# Patient Record
Sex: MALE | Race: WHITE | Employment: OTHER | URBAN - METROPOLITAN AREA
[De-identification: names, ages, dates, MRNs, and addresses within clinical notes are randomized per-mention and may not be internally consistent; named-entity substitution may affect disease eponyms.]

---

## 2017-06-20 ENCOUNTER — OFFICE VISIT (OUTPATIENT)
Dept: DERMATOLOGY CLINIC | Facility: CLINIC | Age: 82
End: 2017-06-20

## 2017-06-20 DIAGNOSIS — L72.11 PILAR CYST: Primary | ICD-10-CM

## 2017-06-20 DIAGNOSIS — L81.4 SOLAR LENTIGO: ICD-10-CM

## 2017-06-20 PROCEDURE — 99213 OFFICE O/P EST LOW 20 MIN: CPT | Performed by: DERMATOLOGY

## 2017-06-20 PROCEDURE — G0463 HOSPITAL OUTPT CLINIC VISIT: HCPCS | Performed by: DERMATOLOGY

## 2017-06-20 RX ORDER — GABAPENTIN 400 MG/1
CAPSULE ORAL
Qty: 180 CAPSULE | Refills: 1 | Status: SHIPPED | OUTPATIENT
Start: 2017-06-20

## 2017-06-20 NOTE — PROGRESS NOTES
HPI:     Chief Complaint     Lesion        HPI     Lesion    Additional comments: Last visit to derm was 10 months prior with Dr. Harika Quesada. Pt states that he requested  to see Dr. Mili Mckeon  today.   Pt is concerned with lesion to mid frontal scalp that has b Education: N/A  Number of Children: N/A     Occupational History  None on file     Social History Main Topics   Smoking status: Former Smoker     Smokeless tobacco: Not on file    Alcohol Use: Yes  0.0 oz/week    0 Standard drinks or equivalent per week

## 2017-06-20 NOTE — PROGRESS NOTES
Past Medical History   Diagnosis Date   • Unspecified essential hypertension          Past Surgical History    SURGERY - [facility]      Comment back surgery    HIP REPLACEMENT SURGERY      KNEE REPLACEMENT SURGERY      Comment rt.         Social History   Marital

## 2017-07-06 ENCOUNTER — OFFICE VISIT (OUTPATIENT)
Dept: FAMILY MEDICINE CLINIC | Facility: CLINIC | Age: 82
End: 2017-07-06

## 2017-07-06 ENCOUNTER — HOSPITAL ENCOUNTER (OUTPATIENT)
Dept: GENERAL RADIOLOGY | Age: 82
Discharge: HOME | End: 2017-07-06
Attending: FAMILY MEDICINE | Admitting: FAMILY MEDICINE
Payer: MEDICARE

## 2017-07-06 VITALS
HEIGHT: 63 IN | SYSTOLIC BLOOD PRESSURE: 129 MMHG | WEIGHT: 207 LBS | HEART RATE: 64 BPM | RESPIRATION RATE: 16 BRPM | TEMPERATURE: 98 F | DIASTOLIC BLOOD PRESSURE: 68 MMHG | BODY MASS INDEX: 36.68 KG/M2

## 2017-07-06 DIAGNOSIS — R42 VERTIGO: ICD-10-CM

## 2017-07-06 DIAGNOSIS — R07.81 RIB PAIN ON LEFT SIDE: ICD-10-CM

## 2017-07-06 PROCEDURE — G0463 HOSPITAL OUTPT CLINIC VISIT: HCPCS | Performed by: FAMILY MEDICINE

## 2017-07-06 PROCEDURE — 71100 X-RAY EXAM RIBS UNI 2 VIEWS: CPT | Performed by: FAMILY MEDICINE

## 2017-07-06 PROCEDURE — 99214 OFFICE O/P EST MOD 30 MIN: CPT | Performed by: FAMILY MEDICINE

## 2017-07-06 RX ORDER — MECLIZINE HCL 12.5 MG/1
12.5 TABLET ORAL 3 TIMES DAILY PRN
Qty: 30 TABLET | Refills: 0 | Status: SHIPPED | OUTPATIENT
Start: 2017-07-06 | End: 2017-07-25

## 2017-07-06 NOTE — PROGRESS NOTES
HPI:    Patient ID: Steve Winslow is a 80year old male. Pt presents with pain of left lower side and back for about 3 weeks. Pt states localilzed over the lower rib area. Pt denies any known trauma. No sig GI  Or  symptoms.  Pt has been taking advil (PROSCAR) 5 MG Oral Tab Take 5 mg by mouth daily. Disp:  Rfl:    tamsulosin HCl (FLOMAX) 0.4 MG Oral Cap Take  by mouth nightly.  Disp:  Rfl:      Allergies:No Known Allergies   PHYSICAL EXAM:   Physical Exam   Constitutional: He is oriented to person, plac Meclizine HCl 12.5 MG Oral Tab 30 tablet 0      Sig: Take 1 tablet (12.5 mg total) by mouth 3 (three) times daily as needed.            Imaging & Referrals:  None       WQ#[id number]

## 2017-07-26 RX ORDER — MECLIZINE HCL 12.5 MG/1
TABLET ORAL
Qty: 30 TABLET | Refills: 0 | Status: SHIPPED | OUTPATIENT
Start: 2017-07-26

## 2017-07-26 NOTE — TELEPHONE ENCOUNTER
Message noted: Chart reviewed and may refill medication as requested times one. Prescription sent to listed pharmacy. Pharmacy to notify patient.

## 2017-09-21 ENCOUNTER — OFFICE VISIT (OUTPATIENT)
Dept: FAMILY MEDICINE CLINIC | Facility: CLINIC | Age: 82
End: 2017-09-21

## 2017-09-21 VITALS
SYSTOLIC BLOOD PRESSURE: 149 MMHG | DIASTOLIC BLOOD PRESSURE: 68 MMHG | TEMPERATURE: 98 F | BODY MASS INDEX: 29 KG/M2 | HEART RATE: 67 BPM | RESPIRATION RATE: 16 BRPM | WEIGHT: 163 LBS

## 2017-09-21 DIAGNOSIS — R42 DIZZINESS: ICD-10-CM

## 2017-09-21 DIAGNOSIS — H53.9 VISUAL DISTURBANCES: Primary | ICD-10-CM

## 2017-09-21 PROCEDURE — G0463 HOSPITAL OUTPT CLINIC VISIT: HCPCS | Performed by: FAMILY MEDICINE

## 2017-09-21 PROCEDURE — 99213 OFFICE O/P EST LOW 20 MIN: CPT | Performed by: FAMILY MEDICINE

## 2017-09-21 NOTE — PROGRESS NOTES
HPI:    Patient ID: Kyra Webster is a 80year old male. Pt presents with some dizziness with movement and light headedness at times when he gets up. Pt tired to get up slowly. This has been present over the last couple months.  Pt has been taking mecli intact distal pulses. Pulmonary/Chest: Effort normal and breath sounds normal.   Abdominal: Soft. Lymphadenopathy:     He has no cervical adenopathy. Neurological: He is alert. He has normal reflexes. No cranial nerve deficit.  Coordination normal.

## 2017-09-29 ENCOUNTER — TELEPHONE (OUTPATIENT)
Dept: FAMILY MEDICINE CLINIC | Facility: CLINIC | Age: 82
End: 2017-09-29

## 2017-10-03 ENCOUNTER — NURSE ONLY (OUTPATIENT)
Dept: FAMILY MEDICINE CLINIC | Facility: CLINIC | Age: 82
End: 2017-10-03

## 2017-10-03 DIAGNOSIS — Z23 NEED FOR INFLUENZA VACCINATION: ICD-10-CM

## 2017-10-03 DIAGNOSIS — Z23 NEED FOR PNEUMOCOCCAL VACCINATION: ICD-10-CM

## 2017-10-03 PROCEDURE — G0009 ADMIN PNEUMOCOCCAL VACCINE: HCPCS | Performed by: FAMILY MEDICINE

## 2017-10-03 PROCEDURE — 90732 PPSV23 VACC 2 YRS+ SUBQ/IM: CPT | Performed by: FAMILY MEDICINE

## 2017-10-03 PROCEDURE — G0008 ADMIN INFLUENZA VIRUS VAC: HCPCS | Performed by: FAMILY MEDICINE

## 2017-10-03 PROCEDURE — 90653 IIV ADJUVANT VACCINE IM: CPT | Performed by: FAMILY MEDICINE

## 2017-10-05 ENCOUNTER — NURSE TRIAGE (OUTPATIENT)
Dept: OTHER | Age: 82
End: 2017-10-05

## 2017-10-05 ENCOUNTER — OFFICE VISIT (OUTPATIENT)
Dept: FAMILY MEDICINE CLINIC | Facility: CLINIC | Age: 82
End: 2017-10-05

## 2017-10-05 VITALS
TEMPERATURE: 98 F | RESPIRATION RATE: 16 BRPM | DIASTOLIC BLOOD PRESSURE: 72 MMHG | HEIGHT: 63 IN | SYSTOLIC BLOOD PRESSURE: 126 MMHG | BODY MASS INDEX: 35.97 KG/M2 | WEIGHT: 203 LBS | HEART RATE: 64 BPM

## 2017-10-05 DIAGNOSIS — T50.Z95A VACCINE REACTION, INITIAL ENCOUNTER: ICD-10-CM

## 2017-10-05 PROCEDURE — 99213 OFFICE O/P EST LOW 20 MIN: CPT | Performed by: FAMILY MEDICINE

## 2017-10-05 PROCEDURE — G0463 HOSPITAL OUTPT CLINIC VISIT: HCPCS | Performed by: FAMILY MEDICINE

## 2017-10-05 NOTE — TELEPHONE ENCOUNTER
Action Requested: Summary for Provider     []  Critical Lab, Recommendations Needed  [] Need Additional Advice  []   FYI    []   Need Orders  [] Need Medications Sent to Pharmacy  []  Other     SUMMARY: OV scheduled for today at 1pm    Pt states that he re

## 2017-10-05 NOTE — PROGRESS NOTES
HPI:    Patient ID: Migdalia Pedraza is a 80year old male. Pt presents with redness, swelling and pain of the right upper arm after receiving the pneumonia vaccine a few days ago. No other injury or acute issues.  Pt used some topical agents and swelling Follow up as needed. Discussed ice and otc antihistamine if needed; Call if any sig symptoms. Follow up as needed. No orders of the defined types were placed in this encounter.       Meds This Visit:  No prescriptions requested or ordered in this enc

## 2017-11-21 ENCOUNTER — APPOINTMENT (OUTPATIENT)
Dept: CT IMAGING | Facility: HOSPITAL | Age: 82
DRG: 291 | End: 2017-11-21
Attending: EMERGENCY MEDICINE
Payer: MEDICARE

## 2017-11-21 ENCOUNTER — HOSPITAL ENCOUNTER (INPATIENT)
Facility: HOSPITAL | Age: 82
LOS: 6 days | Discharge: NURSING, REHAB OR LONG-TERM CARE | DRG: 291 | End: 2017-11-27
Attending: EMERGENCY MEDICINE | Admitting: HOSPITALIST
Payer: MEDICARE

## 2017-11-21 ENCOUNTER — APPOINTMENT (OUTPATIENT)
Dept: GENERAL RADIOLOGY | Facility: HOSPITAL | Age: 82
DRG: 291 | End: 2017-11-21
Attending: EMERGENCY MEDICINE
Payer: MEDICARE

## 2017-11-21 DIAGNOSIS — R09.02 HYPOXIA: Primary | ICD-10-CM

## 2017-11-21 PROCEDURE — 99222 1ST HOSP IP/OBS MODERATE 55: CPT | Performed by: INTERNAL MEDICINE

## 2017-11-21 PROCEDURE — 71010 XR CHEST AP PORTABLE  (CPT=71010): CPT | Performed by: EMERGENCY MEDICINE

## 2017-11-21 PROCEDURE — 71260 CT THORAX DX C+: CPT | Performed by: EMERGENCY MEDICINE

## 2017-11-21 PROCEDURE — 99223 1ST HOSP IP/OBS HIGH 75: CPT | Performed by: HOSPITALIST

## 2017-11-21 RX ORDER — SODIUM PHOSPHATE, DIBASIC AND SODIUM PHOSPHATE, MONOBASIC 7; 19 G/133ML; G/133ML
1 ENEMA RECTAL ONCE AS NEEDED
Status: DISCONTINUED | OUTPATIENT
Start: 2017-11-21 | End: 2017-11-27

## 2017-11-21 RX ORDER — DOCUSATE SODIUM 100 MG/1
100 CAPSULE, LIQUID FILLED ORAL 2 TIMES DAILY
Status: DISCONTINUED | OUTPATIENT
Start: 2017-11-21 | End: 2017-11-27

## 2017-11-21 RX ORDER — ACETAMINOPHEN 325 MG/1
650 TABLET ORAL EVERY 6 HOURS PRN
Status: DISCONTINUED | OUTPATIENT
Start: 2017-11-21 | End: 2017-11-27

## 2017-11-21 RX ORDER — FUROSEMIDE 10 MG/ML
40 INJECTION INTRAMUSCULAR; INTRAVENOUS
Status: DISCONTINUED | OUTPATIENT
Start: 2017-11-21 | End: 2017-11-23

## 2017-11-21 RX ORDER — SODIUM CHLORIDE 0.9 % (FLUSH) 0.9 %
3 SYRINGE (ML) INJECTION AS NEEDED
Status: DISCONTINUED | OUTPATIENT
Start: 2017-11-21 | End: 2017-11-27

## 2017-11-21 RX ORDER — BISACODYL 10 MG
10 SUPPOSITORY, RECTAL RECTAL
Status: DISCONTINUED | OUTPATIENT
Start: 2017-11-21 | End: 2017-11-27

## 2017-11-21 RX ORDER — POLYETHYLENE GLYCOL 3350 17 G/17G
17 POWDER, FOR SOLUTION ORAL DAILY PRN
Status: DISCONTINUED | OUTPATIENT
Start: 2017-11-21 | End: 2017-11-27

## 2017-11-21 RX ORDER — ONDANSETRON 2 MG/ML
4 INJECTION INTRAMUSCULAR; INTRAVENOUS EVERY 6 HOURS PRN
Status: DISCONTINUED | OUTPATIENT
Start: 2017-11-21 | End: 2017-11-27

## 2017-11-21 RX ORDER — HEPARIN SODIUM 5000 [USP'U]/ML
5000 INJECTION, SOLUTION INTRAVENOUS; SUBCUTANEOUS EVERY 12 HOURS SCHEDULED
Status: DISCONTINUED | OUTPATIENT
Start: 2017-11-21 | End: 2017-11-27

## 2017-11-21 NOTE — ED NOTES
Spoke to Inés in laboratory regarding trop 0 and BNP lab work that was added on- he states he will address it and call if any issues. Will watch for results.

## 2017-11-21 NOTE — CONSULTS
[facility] AND [facility]  [facility] Cardiology Consult Note    Key Gagnon Patient Status:  Emergency    10/11/1928 MRN [id number]   Location [address] Attending Pradip Dunn MD   Hosp Day # 0 PCP Anastasia Corral MD     80 year drugs.    Objective:   Temp: 98.1 °F (36.7 °C)  Pulse: 57  Resp: 19  BP: 124/55    Intake/Output:   No intake or output data in the 24 hours ending 11/21/17 1713    Physical Exam:     General: Alert and oriented x 3. No apparent distress.  No respiratory or

## 2017-11-21 NOTE — ED NOTES
Report given to [facility] Patient updated on room assignment and accepting of admission plan. Cardiology at the bedside at this time. Tele box ordered. Belongings gathered for admission. RN made aware of next lactic acid collection to be drawn at 1817.  Aw

## 2017-11-21 NOTE — ED INITIAL ASSESSMENT (HPI)
Pt reports getting dizziness and falling for the past 2 weeks. Pt was at the independent living when he fell in the hallway. Pt denies pain. No loc. Pt was on his way to his pmd when this occurred.

## 2017-11-21 NOTE — H&P
[facility]    PATIENT'S NAME: Abi Soto PHYSICIAN: Mario Blue MD   PATIENT ACCOUNT#:   [id number]    LOCATION:  [place] [address]  MEDICAL RECORD #:   [id number]       YOB: 1928  ADMISSION DATE:       11/21/20 living. No significant alcohol or drug use. REVIEW OF SYSTEMS:  The patient said he had progressive dyspnea on exertion for the last week or two. No orthopnea. No fever or chills. No cough. No chest pain.   No abdominal pain or change in bowel movem

## 2017-11-21 NOTE — ED PROVIDER NOTES
Patient Seen in: [facility] AND [facility] Emergency Department     History   Patient presents with:  Dizziness (neurologic)    Stated Complaint:     HPI    80year old male presents to the ED complaining of intermittent episodes of lightheadedness/near syncope fo reviewed. All other systems reviewed and negative except as noted above. PSFH elements reviewed from today and agreed except as otherwise stated in HPI.     Physical Exam   ED Triage Vitals  BP: 130/71 [11/21/17 1316]  Pulse: 71 [11/21/17 1316]  Res vitals reviewed  Labs Reviewed   BASIC METABOLIC PANEL (8) - Abnormal; Notable for the following:        Result Value    Glucose 137 (*)     GFR, Non- 56 (*)     All other components within normal limits   BNP (B TYPE NATRIUERTIC PEPTIDE) - INDICATIONS: Essential HypertensionDizziness and shortness of breath     today. TECHNIQUE:   Single view. FINDINGS:     CARDIAC/VASC: Mild cardiomegaly.  Pulmonary vascular margins obscured    MEDIAST/MAHOGANY:   Atherosclerotic aorta with no those reports. Radiology Interpretation: Radiology reports and images reviewed personally and are remarkable for interstitial infiltrate, likely, edema.  I discussed these results, their acute management issues, and the need for follow-up, with the patien up information were provided prior to discharge from the ED if sent home.  We also recommended that the patient schedule follow up care with a primary care provider as soon as possible to obtain basic health screening including reassessment of blood pressur

## 2017-11-22 ENCOUNTER — APPOINTMENT (OUTPATIENT)
Dept: CV DIAGNOSTICS | Facility: HOSPITAL | Age: 82
DRG: 291 | End: 2017-11-22
Attending: HOSPITALIST
Payer: MEDICARE

## 2017-11-22 PROCEDURE — 99232 SBSQ HOSP IP/OBS MODERATE 35: CPT | Performed by: INTERNAL MEDICINE

## 2017-11-22 PROCEDURE — 99233 SBSQ HOSP IP/OBS HIGH 50: CPT | Performed by: HOSPITALIST

## 2017-11-22 PROCEDURE — 93306 TTE W/DOPPLER COMPLETE: CPT | Performed by: HOSPITALIST

## 2017-11-22 RX ORDER — IPRATROPIUM BROMIDE AND ALBUTEROL SULFATE 2.5; .5 MG/3ML; MG/3ML
3 SOLUTION RESPIRATORY (INHALATION) EVERY 6 HOURS
Status: DISCONTINUED | OUTPATIENT
Start: 2017-11-22 | End: 2017-11-23

## 2017-11-22 RX ORDER — 0.9 % SODIUM CHLORIDE 0.9 %
VIAL (ML) INJECTION
Status: COMPLETED
Start: 2017-11-22 | End: 2017-11-22

## 2017-11-22 RX ORDER — POTASSIUM CHLORIDE 20 MEQ/1
40 TABLET, EXTENDED RELEASE ORAL EVERY 4 HOURS
Status: COMPLETED | OUTPATIENT
Start: 2017-11-22 | End: 2017-11-22

## 2017-11-22 RX ORDER — GABAPENTIN 400 MG/1
1200 CAPSULE ORAL 3 TIMES DAILY
Status: ON HOLD | COMMUNITY
End: 2017-11-23

## 2017-11-22 NOTE — PLAN OF CARE
Problem: SAFETY ADULT - FALL  Goal: Free from fall injury  INTERVENTIONS:  - Assess pt frequently for physical needs  - Identify cognitive and physical deficits and behaviors that affect risk of falls.   - [place] fall precautions as indicated by assessme

## 2017-11-22 NOTE — CONSULTS
[facility]    Report of Consultation    Violetayne Pyo Patient Status:  Inpatient    10/11/1928 MRN [id number]   Location [facility] 3W/SW Attending Charisse Kaur MD   Hosp Day # 0 PCP Hernando Peralta MD     Date of Admission: ondansetron HCl (ZOFRAN) injection 4 mg 4 mg Intravenous Q6H PRN   docusate sodium (COLACE) cap 100 mg 100 mg Oral BID   PEG 3350 (MIRALAX) powder packet 17 g 17 g Oral Daily PRN   magnesium hydroxide (MILK OF MAGNESIA) 400 MG/5ML suspension 30 mL 30 mL edema  Neurologic: no gross motor deficits  Skin: warm, dry    Results:   Laboratory Data    Lab Results  Component Value Date   WBC 9.4 11/21/2017   HGB 12.6 (L) 11/21/2017   HCT 38.1 (L) 11/21/2017    11/21/2017   CREATSERUM 1.22 11/21/2017   BUN significant dyspnea with exertion over the course last week. Concern clinically for CHF exacerbation. -CT chest reviewed with evidence of diffuse lateral groundglass opacities with septal thinning seen. Clinically concerning for pulmonary edema.   Parase

## 2017-11-22 NOTE — PROGRESS NOTES
[facility]  Hospitalist Progress  Note     Key Gagnon Patient Status:  Inpatient    10/11/1928  80year old CSN [id number]   Location 343/343-A Attending Dandre Garcia MD   Hosp Day # 1 PCP Anastasia Corral MD     ASSESSMENT/PLAN    Ac GFRNAA  56*  >60   CA  8.8  8.7   NA  138  137   K  3.9  3.4   CL  106  102   CO2  23  26     No results for input(s): PT, INR, PTT in the last 72 hours.     • ipratropium-albuterol  3 mL Nebulization Q6H   • Potassium Chloride ER  40 mEq Oral Q4H   • fur

## 2017-11-22 NOTE — PROGRESS NOTES
[facility]     Progress Note        Serge Vick Patient Status:  Inpatient    10/11/1928 MRN [id number]   Location [facility] 3W/SW Attending Neal Morejon MD   Hosp Day # 1 PCP Isatu Larsen MD       Subjective:   Patient BUN 11 11/22/2017    11/22/2017   K 3.4 11/22/2017    11/22/2017   CO2 26 11/22/2017    11/22/2017   CA 8.7 11/22/2017   MG 2.0 11/22/2017   TROP 0.01 11/21/2017       Xr Chest Ap Portable  (cpt=71010)    Result Date: 11/21/2017  CONCL CHF exacerbation. -CT chest reviewed with evidence of diffuse lateral groundglass opacities with septal thinning seen. Clinically concerning for pulmonary edema. Paraseptal emphysema along with mediastinal and bilateral hilar lymphadenopathy also noted.

## 2017-11-22 NOTE — PROGRESS NOTES
[facility] AND [facility]  [facility] Cardiology Progress Note    Gabby Giron Patient Status:  Inpatient    10/11/1928 MRN [id number]   Location [facility] 3W/SW Attending Yu Nguyen MD   Hosp Day # 1 PCP Yoni Joyce MD     80year old male, con sclera, neck supple. Neck: No JVD, carotids 2+, no bruits. Cardiac: Regular rate and rhythm. S1, S2 normal. No murmur, pericardial rub, S3.  Lungs: Clear without wheezes, rales, rhonchi or dullness. Normal excursions and effort.   Abdomen: Soft, non-tend

## 2017-11-23 PROCEDURE — 99233 SBSQ HOSP IP/OBS HIGH 50: CPT | Performed by: HOSPITALIST

## 2017-11-23 PROCEDURE — 99233 SBSQ HOSP IP/OBS HIGH 50: CPT | Performed by: INTERNAL MEDICINE

## 2017-11-23 RX ORDER — FUROSEMIDE 40 MG/1
40 TABLET ORAL
Status: DISCONTINUED | OUTPATIENT
Start: 2017-11-23 | End: 2017-11-24

## 2017-11-23 RX ORDER — FINASTERIDE 5 MG/1
5 TABLET, FILM COATED ORAL DAILY
Status: DISCONTINUED | OUTPATIENT
Start: 2017-11-23 | End: 2017-11-27

## 2017-11-23 RX ORDER — AMLODIPINE BESYLATE 10 MG/1
10 TABLET ORAL DAILY
Status: DISCONTINUED | OUTPATIENT
Start: 2017-11-23 | End: 2017-11-24

## 2017-11-23 RX ORDER — ALFUZOSIN HYDROCHLORIDE 10 MG/1
10 TABLET, EXTENDED RELEASE ORAL
Status: DISCONTINUED | OUTPATIENT
Start: 2017-11-23 | End: 2017-11-27

## 2017-11-23 RX ORDER — PANTOPRAZOLE SODIUM 40 MG/1
40 TABLET, DELAYED RELEASE ORAL
Status: DISCONTINUED | OUTPATIENT
Start: 2017-11-23 | End: 2017-11-27

## 2017-11-23 RX ORDER — ATENOLOL 50 MG/1
50 TABLET ORAL DAILY
Status: DISCONTINUED | OUTPATIENT
Start: 2017-11-23 | End: 2017-11-24

## 2017-11-23 RX ORDER — IPRATROPIUM BROMIDE AND ALBUTEROL SULFATE 2.5; .5 MG/3ML; MG/3ML
3 SOLUTION RESPIRATORY (INHALATION)
Status: DISCONTINUED | OUTPATIENT
Start: 2017-11-23 | End: 2017-11-26

## 2017-11-23 RX ORDER — GABAPENTIN 400 MG/1
1200 CAPSULE ORAL 2 TIMES DAILY
Status: DISCONTINUED | OUTPATIENT
Start: 2017-11-23 | End: 2017-11-27

## 2017-11-23 RX ORDER — IPRATROPIUM BROMIDE AND ALBUTEROL SULFATE 2.5; .5 MG/3ML; MG/3ML
3 SOLUTION RESPIRATORY (INHALATION) EVERY 6 HOURS PRN
Status: DISCONTINUED | OUTPATIENT
Start: 2017-11-23 | End: 2017-11-26

## 2017-11-23 NOTE — PROGRESS NOTES
Pulmonary/Critical Care Follow Up Note    HPI:   Francis York is a 80year old male with Patient presents with:  Dizziness (neurologic)      PCP Jake Gomez MD  Admission Attending Beba Melendez MD    Hospital Day #2    Got up to bathroom and then g temperature source Oral, resp. rate 20, height 5' 5\" (1.651 m), weight 189 lb 8 oz (86 kg), SpO2 94 %.     Intake/Output Summary (Last 24 hours) at 11/23/17 1313  Last data filed at 11/23/17 0612   Gross per 24 hour   Intake                0 ml   Output

## 2017-11-23 NOTE — PROGRESS NOTES
[facility] AND [facility]  [facility] Cardiology Progress Note    Danielito Felipe Patient Status:  Inpatient    10/11/1928 MRN [id number]   Location [facility] 3W/SW Attending Selvin Banda MD   Hosp Day # 2 PCP Celestina Mcclure MD     80year old male, con neck supple. Neck: No JVD, carotids 2+, no bruits. Cardiac: Regular rate and rhythm. S1, S2 normal. No murmur, pericardial rub, S3.  Lungs: Clear without wheezes, rales, rhonchi or dullness. Normal excursions and effort. Abdomen: Soft, non-tender.  BS-p

## 2017-11-23 NOTE — PROGRESS NOTES
[facility]  Hospitalist Progress  Note     Lyndsay Greene Patient Status:  Inpatient    10/11/1928  80year old CSN [id number]   Location 343/343-A Attending Kody Powers MD   Hosp Day # 2 PCP Varun Ledezma MD     ASSESSMENT/PLAN    Ac 11/21/17   1319  11/22/17   0555  11/22/17   1542  11/23/17   0621   GLU  137*  128*   --   149*   BUN  17  11   --   13   CREATSERUM  1.22  1.03   --   1.33   GFRAA  >60  >60   --   >60   GFRNAA  56*  >60   --   51*   CA  8.8  8.7   --   9.5   NA  138  13

## 2017-11-23 NOTE — PLAN OF CARE
RESPIRATORY - ADULT    • Achieves optimal ventilation and oxygenation Progressing        SAFETY ADULT - FALL    • Free from fall injury Progressing          Pt denies cp/sob at rest. Echo completed. IV lasix BID.  Upon ambulation patient desatted to 80% on

## 2017-11-24 ENCOUNTER — APPOINTMENT (OUTPATIENT)
Dept: GENERAL RADIOLOGY | Facility: HOSPITAL | Age: 82
DRG: 291 | End: 2017-11-24
Attending: HOSPITALIST
Payer: MEDICARE

## 2017-11-24 PROCEDURE — 99232 SBSQ HOSP IP/OBS MODERATE 35: CPT | Performed by: INTERNAL MEDICINE

## 2017-11-24 PROCEDURE — 71020 XR CHEST PA + LAT CHEST (CPT=71020): CPT | Performed by: HOSPITALIST

## 2017-11-24 PROCEDURE — 99233 SBSQ HOSP IP/OBS HIGH 50: CPT | Performed by: HOSPITALIST

## 2017-11-24 RX ORDER — HYDRALAZINE HYDROCHLORIDE 25 MG/1
25 TABLET, FILM COATED ORAL EVERY 8 HOURS SCHEDULED
Status: DISCONTINUED | OUTPATIENT
Start: 2017-11-24 | End: 2017-11-27

## 2017-11-24 RX ORDER — METOPROLOL SUCCINATE 50 MG/1
50 TABLET, EXTENDED RELEASE ORAL
Status: DISCONTINUED | OUTPATIENT
Start: 2017-11-25 | End: 2017-11-27

## 2017-11-24 NOTE — CM/SW NOTE
11/24-MD orders received in regards to discharge planning. The Patientwas seen at bedside. The Patient resides alone in [place] in a [address] [place] [address] Road.   Prior to hospitalization, the Patient wasn't driving, but doing grocery shopping, e

## 2017-11-24 NOTE — OCCUPATIONAL THERAPY NOTE
OCCUPATIONAL THERAPY EVALUATION - INPATIENT     Room Number: 343/343-A  Evaluation Date: 11/24/2017  Type of Evaluation: Initial  Presenting Problem:  (SOB)    Physician Order: IP Consult to Occupational Therapy  Reason for Therapy: ADL/IADL Dysfunction an Plan: Balance activities; Energy conservation/work simplification techniques;ADL training;Functional transfer training; Endurance training;Patient/Family education;Patient/Family training         OCCUPATIONAL THERAPY MEDICAL/SOCIAL HISTORY     Problem List need…  -   Putting on and taking off regular lower body clothing?: A Lot  -   Bathing (including washing, rinsing, drying)?: A Lot  -   Toileting, which includes using toilet, bedpan or urinal? : A Lot  -   Putting on and taking off regular upper body clot

## 2017-11-24 NOTE — PROGRESS NOTES
[facility] HOSP - [facility]  Progress Note     Francis York  : 10/11/1928    Status: Inpatient  Day #: 3    Attending: Delaney Baer MD  PCP: Jake Gomez MD      Assessment and Plan     Acute respiratory failure with hypoxemia.   Admission O2 sat was 87% 30.7  30.8   MCHC  33.1  33.1  32.9   RDW  15.9*  16.2*  16.8*     Recent Labs   Lab  11/21/17   1319  11/22/17   0555  11/22/17   1542  11/23/17   0621  11/24/17   0545   BUN  17  11   --   13  25*   CREATSERUM  1.22  1.03   --   1.33  1.79*   GFRAA  >60

## 2017-11-24 NOTE — PHYSICAL THERAPY NOTE
PHYSICAL THERAPY EVALUATION - INPATIENT     Room Number: 343/343-A  Evaluation Date: 11/24/2017  Type of Evaluation: Initial  Physician Order: PT Eval and Treat    Presenting Problem: hypoxia and falls  Reason for Therapy: Mobility Dysfunction and Disc mechanics; Endurance; Patient education;Gait training;Transfer training;Balance training  Rehab Potential : Fair  Frequency (Obs): 3x/week       PHYSICAL THERAPY MEDICAL/SOCIAL HISTORY   Problem List  Principal Problem:    Hypoxia  Active Problems:    Acute '6-Clicks' INPATIENT SHORT FORM - BASIC MOBILITY  How much difficulty does the patient currently have. ..  -   Turning over in bed (including adjusting bedclothes, sheets and blankets)?: A Little   -   Sitting down on and standing up from a chair with arms #6  Current Status

## 2017-11-24 NOTE — PLAN OF CARE
CARDIOVASCULAR - ADULT    • Maintains optimal cardiac output and hemodynamic stability Progressing        Patient/Family Goals    • Patient/Family Long Term Goal Progressing    • Patient/Family Short Term Goal Progressing        RESPIRATORY - ADULT    • Ac

## 2017-11-24 NOTE — PROGRESS NOTES
[facility]    Cardiology Progress Note  [facility]    Lm  Patient Status:  Inpatient    10/11/1928 MRN [id number]   Location [facility] 3W/SW Attending Jack Nieves MD   Hosp Day # 3 PCP Aquiles Rich  11/24/2017   CO2 25 11/24/2017    (H) 11/24/2017   CA 9.4 11/24/2017   ALB 4.3 12/14/2015   BILT 1.9 (H) 12/14/2015   TP 7.0 12/14/2015   AST 27 12/14/2015   ALT 24 12/14/2015   MG 2.3 11/24/2017   TROP 0.01 11/21/2017       Xr Chest Pa + L

## 2017-11-24 NOTE — PROGRESS NOTES
[facility] HOSP - [facility]     Progress Note        Gabby Giron Patient Status:  Inpatient    10/11/1928 MRN [id number]   Location [facility] 3W/SW Attending Yu Nguyen MD   Hosp Day # 3 PCP Yoni Joyce MD       Subjective:   Patient suppository 10 mg 10 mg Rectal Daily PRN   FLEET ENEMA (FLEET) 7-19 GM/118ML enema 133 mL 1 enema Rectal Once PRN       Continuous Infusions:     Physical Exam  Constitutional: no acute distress  HEENT: PERRL  Cardio: RRR, S1 S2  Respiratory: Faint basilar [address] [place] [address]

## 2017-11-25 PROCEDURE — 99233 SBSQ HOSP IP/OBS HIGH 50: CPT | Performed by: HOSPITALIST

## 2017-11-25 PROCEDURE — 99232 SBSQ HOSP IP/OBS MODERATE 35: CPT | Performed by: INTERNAL MEDICINE

## 2017-11-25 RX ORDER — FUROSEMIDE 20 MG/1
20 TABLET ORAL DAILY
Status: DISCONTINUED | OUTPATIENT
Start: 2017-11-25 | End: 2017-11-27

## 2017-11-25 NOTE — PROGRESS NOTES
[facility] Cardiology Progress Note      Lyndsay Greene Patient Status:  Inpatient    10/11/1928 MRN [id number]   Location [facility] 3W/SW Attending Kasia Peralta MD   Hosp Day # 4 PCP Varun Ledezma MD     ------ nontender, nondistended, no organomegaly, bowel sounds present  Ext:  no clubbing, no cyanosis,no edema  Neuro: no focal deficits  Skin: no rashes or lesions    Scheduled Meds:   • hydrALAzine HCl  25 mg Oral Q8H [facility]   • metoprolol succinate  50 mg Oral Da

## 2017-11-25 NOTE — PROGRESS NOTES
[facility]     Progress Note        Steve Winslow Patient Status:  Inpatient    10/11/1928 MRN [id number]   Location [facility] 3W/SW Attending Suresh Li MD   Hosp Day # 4 PCP Claritza Saul MD       Subjective:   Patient enema 133 mL 1 enema Rectal Once PRN       Continuous Infusions:     Physical Exam  Constitutional: no acute distress  HEENT: PERRL  Cardio: RRR, S1 S2  Respiratory: Faint basilar crackles  GI: abdomen soft, non tender  Extremities: no clubbing, cyanosis,

## 2017-11-25 NOTE — PLAN OF CARE
Problem: SAFETY ADULT - FALL  Goal: Free from fall injury  INTERVENTIONS:  - Assess pt frequently for physical needs  - Identify cognitive and physical deficits and behaviors that affect risk of falls.   - [place] fall precautions as indicated by assessme Progressing      Comments: PO Lasix given. O2 3.5lt/nc, saturating above 90%. Voiding per urinal, incontinent at times. Daughter at bedside.

## 2017-11-25 NOTE — CM/SW NOTE
MD order received regarding discharge planning for rehab. MARINA met with the pt at bedside who is agreeable to going to rehab. The pt. Would like a referral to [facility]. Referral made and DON screen has been requested from [address] [place] [address] Delia. The pt.  Is aware and

## 2017-11-25 NOTE — SIGNIFICANT EVENT
CV R/T PAT. Data: patient had 3.63 sec PAT at 2204. Action: Asymptomatic with PAT. Response: Will continue to monitor the patient status .

## 2017-11-26 PROCEDURE — 99233 SBSQ HOSP IP/OBS HIGH 50: CPT | Performed by: HOSPITALIST

## 2017-11-26 PROCEDURE — 99232 SBSQ HOSP IP/OBS MODERATE 35: CPT | Performed by: INTERNAL MEDICINE

## 2017-11-26 RX ORDER — GUAIFENESIN 600 MG
600 TABLET, EXTENDED RELEASE 12 HR ORAL 2 TIMES DAILY
Status: DISCONTINUED | OUTPATIENT
Start: 2017-11-26 | End: 2017-11-27

## 2017-11-26 RX ORDER — IPRATROPIUM BROMIDE AND ALBUTEROL SULFATE 2.5; .5 MG/3ML; MG/3ML
3 SOLUTION RESPIRATORY (INHALATION)
Status: DISCONTINUED | OUTPATIENT
Start: 2017-11-26 | End: 2017-11-27

## 2017-11-26 RX ORDER — IPRATROPIUM BROMIDE AND ALBUTEROL SULFATE 2.5; .5 MG/3ML; MG/3ML
3 SOLUTION RESPIRATORY (INHALATION) EVERY 6 HOURS PRN
Status: DISCONTINUED | OUTPATIENT
Start: 2017-11-26 | End: 2017-11-27

## 2017-11-26 NOTE — PROGRESS NOTES
[facility] Cardiology Progress Note      Diane Lover Patient Status:  Inpatient    10/11/1928 MRN [id number]   Location [facility] 3W/SW Attending Clara Mckeon MD   Hosp Day # 5 PCP Marybel Cameron MD       ---- Void Urine Occurrence 4 x 1 x 1 x    Incontinent Urine Occurrence 1 x 7 x              Exam  Gen: No acute distress, alert and oriented x3,   Neck:supple,no JVD  Pulm: Lungs clear, normal respiratory effort  CV: Heart with regular rate and rhythm, Nl S1,S2

## 2017-11-26 NOTE — PHYSICAL THERAPY NOTE
PHYSICAL THERAPY TREATMENT NOTE - INPATIENT    Room Number: 343/343-A       Presenting Problem: hypoxia and falls    Problem List  Principal Problem:    Hypoxia  Active Problems:    Acute diastolic CHF (congestive heart failure) ([facility] [address]      ASSESSMENT   Pt assistance  Distance (ft): 2 x 50  Assistive Device: Rolling walker     Stoop/Curb Assistance: Not tested       Additional information:     THERAPEUTIC EXERCISES  Lower Extremity AP,QS,GS,abd/add     Position   supine     Patient End of Session: In bed;Sunny

## 2017-11-26 NOTE — PROGRESS NOTES
[facility]  Progress Note     Juan Santos  : 10/11/1928    Status: Inpatient  Day #: 5    Attending: Elias Garcia MD  PCP: Kaya Cantu MD      Assessment and Plan     Acute respiratory failure with hypoxemia.   Admission O2 sat was 87% 92.6  92.2   MCH  30.8  31.0  31.8   MCHC  32.9  33.4  34.5   RDW  16.8*  16.0*  15.9*     Recent Labs   Lab  11/24/17   0545  11/25/17   0617  11/26/17   0759   BUN  25*  31*  24*   CREATSERUM  1.79*  1.49  1.16   GFRAA  43*  54*  >60   GFRNAA  36*  44*

## 2017-11-26 NOTE — PLAN OF CARE
Problem: SAFETY ADULT - FALL  Goal: Free from fall injury  INTERVENTIONS:  - Assess pt frequently for physical needs  - Identify cognitive and physical deficits and behaviors that affect risk of falls.   - [place] fall precautions as indicated by assessme Progressing      Comments: Wean Oxygen as tolerated. Up in chair. Refused to sit for longer time. Incontinent. To rehab possible in am.Encouraged patient to use incentive spirometry and turn in bed.

## 2017-11-26 NOTE — PROGRESS NOTES
[facility]     Progress Note        Sowmya Jcbhavik Patient Status:  Inpatient    10/11/1928 MRN [id number]   Location [facility] 3W/SW Attending Jay Andrea MD   Hosp Day # 5 PCP Jose Gimenez MD       Subjective:   Patient rectal suppository 10 mg 10 mg Rectal Daily PRN   FLEET ENEMA (FLEET) 7-19 GM/118ML enema 133 mL 1 enema Rectal Once PRN       Continuous Infusions:     Physical Exam  Constitutional: no acute distress  HEENT: PERRL  Cardio: RRR, S1 S2  Respiratory: Faint

## 2017-11-27 VITALS
TEMPERATURE: 98 F | SYSTOLIC BLOOD PRESSURE: 134 MMHG | HEIGHT: 65 IN | DIASTOLIC BLOOD PRESSURE: 67 MMHG | HEART RATE: 80 BPM | OXYGEN SATURATION: 96 % | BODY MASS INDEX: 32.77 KG/M2 | RESPIRATION RATE: 20 BRPM | WEIGHT: 196.69 LBS

## 2017-11-27 PROCEDURE — 99239 HOSP IP/OBS DSCHRG MGMT >30: CPT | Performed by: HOSPITALIST

## 2017-11-27 RX ORDER — HYDRALAZINE HYDROCHLORIDE 25 MG/1
25 TABLET, FILM COATED ORAL EVERY 8 HOURS SCHEDULED
Qty: 90 TABLET | Refills: 0 | Status: SHIPPED | OUTPATIENT
Start: 2017-11-27 | End: 2018-01-11

## 2017-11-27 RX ORDER — FUROSEMIDE 20 MG/1
20 TABLET ORAL DAILY
Qty: 30 TABLET | Refills: 0 | Status: SHIPPED | OUTPATIENT
Start: 2017-11-28 | End: 2018-01-11

## 2017-11-27 RX ORDER — IPRATROPIUM BROMIDE AND ALBUTEROL SULFATE 2.5; .5 MG/3ML; MG/3ML
3 SOLUTION RESPIRATORY (INHALATION) EVERY 6 HOURS PRN
Refills: 0 | Status: SHIPPED | COMMUNITY
Start: 2017-11-27 | End: 2018-01-31

## 2017-11-27 RX ORDER — METOPROLOL SUCCINATE 50 MG/1
50 TABLET, EXTENDED RELEASE ORAL
Qty: 30 TABLET | Refills: 0 | Status: SHIPPED | OUTPATIENT
Start: 2017-11-28 | End: 2018-01-11

## 2017-11-27 NOTE — DISCHARGE SUMMARY
[facility] - [facility]  Discharge Summary     Jennifer Chavez  : 10/11/1928    Status: Inpatient  Day #: 6    Attending: Chela Su MD  PCP: Sandeep Grant MD     Date of Admission: 2017  Date of Discharge: 2017     Hospital Discharge Diag normal bowel sounds  Musculoskeletal:  No joint swelling  Extremities:  No edema, no cyanosis, no clubbing  Neurologic:  nonfocal  Psychiatric:  Normal affect, calm and appropriate  Skin:  No rash, no lesion         Discharge Medications      START taking Get Your Medications      Please  your prescriptions at the location directed by your doctor or nurse    Bring a paper prescription for each of these medications  · furosemide 20 MG Tabs  · hydrALAzine HCl 25 MG Tabs  · Metoprolol Succinate ER 50 MG

## 2017-11-27 NOTE — CM/SW NOTE
11/27/17 CM Discharge planning   Spoke with Mindy wilkerson at [facility], bed available for pt today at 2:00 pm, room 301-A, RN report [phone], pt and family aware and in agreement with above rehab transfer.   Karyn Corley  X [phone]

## 2017-11-27 NOTE — PHYSICAL THERAPY NOTE
PHYSICAL THERAPY TREATMENT NOTE - INPATIENT    Room Number: 343/343-A       Presenting Problem: hypoxia and falls    Problem List  Principal Problem:    Hypoxia  Active Problems:    Acute diastolic CHF (congestive heart failure) ([facility] [address]      ASSESSMENT   RN and standing up from a chair with arms (e.g., wheelchair, bedside commode, etc.): A Little   -   Moving from lying on back to sitting on the side of the bed?: A Little   How much help from another person does the patient currently need. ..   -   Moving to a

## 2017-11-28 ENCOUNTER — TELEPHONE (OUTPATIENT)
Dept: CARDIOLOGY UNIT | Facility: HOSPITAL | Age: 82
End: 2017-11-28

## 2017-12-08 ENCOUNTER — OFFICE VISIT (OUTPATIENT)
Dept: CARDIOLOGY CLINIC | Facility: HOSPITAL | Age: 82
End: 2017-12-08
Attending: INTERNAL MEDICINE
Payer: MEDICARE

## 2017-12-08 VITALS
WEIGHT: 199.63 LBS | SYSTOLIC BLOOD PRESSURE: 168 MMHG | HEART RATE: 79 BPM | OXYGEN SATURATION: 97 % | DIASTOLIC BLOOD PRESSURE: 70 MMHG | BODY MASS INDEX: 33 KG/M2

## 2017-12-08 DIAGNOSIS — I50.31 ACUTE DIASTOLIC CHF (CONGESTIVE HEART FAILURE) (HCC): ICD-10-CM

## 2017-12-08 DIAGNOSIS — I50.9 HEART FAILURE, UNSPECIFIED (HCC): Primary | ICD-10-CM

## 2017-12-08 PROCEDURE — 83880 ASSAY OF NATRIURETIC PEPTIDE: CPT | Performed by: CLINICAL NURSE SPECIALIST

## 2017-12-08 PROCEDURE — 99214 OFFICE O/P EST MOD 30 MIN: CPT | Performed by: CLINICAL NURSE SPECIALIST

## 2017-12-08 PROCEDURE — 80048 BASIC METABOLIC PNL TOTAL CA: CPT | Performed by: CLINICAL NURSE SPECIALIST

## 2017-12-08 PROCEDURE — 99211 OFF/OP EST MAY X REQ PHY/QHP: CPT | Performed by: CLINICAL NURSE SPECIALIST

## 2017-12-08 PROCEDURE — 36415 COLL VENOUS BLD VENIPUNCTURE: CPT | Performed by: CLINICAL NURSE SPECIALIST

## 2017-12-08 NOTE — PATIENT INSTRUCTIONS
Saturday and Sunday, please increase furosemide to 20 mg in the morning and 20 mg in the afternoon and then continue furosemide 20 mg daily. Continue tracking daily weights. Call with weight gain of 3 lbs overnight or concerning symptoms.    [phone]

## 2017-12-08 NOTE — PROGRESS NOTES
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 He understands that he may require long term oxygen therapy. His lungs are with bibasilar crackles. No JVD. Denies abdominal swelling/bloating. No BLE edema.  Denies dizziness, lightheaded patient providing counseling, coordination of care and education related specifically to heart failure.       ANJU Ashton  12/8/2017  11:01 AM

## 2017-12-26 ENCOUNTER — TELEPHONE (OUTPATIENT)
Dept: FAMILY MEDICINE CLINIC | Facility: CLINIC | Age: 82
End: 2017-12-26

## 2017-12-26 NOTE — TELEPHONE ENCOUNTER
[facility] from residential home health calling to let Dr. Lesley Rodriguez know that pt was admitted to home health service, she will sent over orders and also request orders  O2 states . Abdiaziz Villanueva please advise

## 2017-12-26 NOTE — TELEPHONE ENCOUNTER
Message noted and approve home health; will sign orders when received. Can approved home health orders requested.

## 2017-12-28 ENCOUNTER — TELEPHONE (OUTPATIENT)
Dept: FAMILY MEDICINE CLINIC | Facility: CLINIC | Age: 82
End: 2017-12-28

## 2017-12-28 NOTE — TELEPHONE ENCOUNTER
Pls call back Starla/ Therapy Supervisor, need verbal Order to move OT to the wk of 12/31/2017. Pls advise.

## 2017-12-28 NOTE — TELEPHONE ENCOUNTER
LMTCB. Please transfer to triage. Name on voicemail did not correspond to number left from Elba Robledo. I did not leave a message.

## 2017-12-29 ENCOUNTER — TELEPHONE (OUTPATIENT)
Dept: FAMILY MEDICINE CLINIC | Facility: CLINIC | Age: 82
End: 2017-12-29

## 2017-12-29 NOTE — TELEPHONE ENCOUNTER
[facility] states pt diagnosed pneunomia-  taking Avalox 400 mg once daily for 7 days    Pt still coughing, has phlegm- listening to lungs noted crackles on left lower lobe     Does Dr Iris King want to extend the antibiotics?     Confirmed

## 2017-12-30 RX ORDER — MOXIFLOXACIN HYDROCHLORIDE 400 MG/1
400 TABLET ORAL DAILY
Qty: 7 TABLET | Refills: 0 | Status: SHIPPED | OUTPATIENT
Start: 2017-12-30 | End: 2018-01-03

## 2017-12-30 NOTE — TELEPHONE ENCOUNTER
Message noted. May continue avelox for another 7 days as requested. Erx sent to listed pharmacy.  To follow up for appointment if not better; Please notify patient

## 2018-01-01 ENCOUNTER — HOSPITAL ENCOUNTER (OUTPATIENT)
Dept: RESPIRATORY THERAPY | Facility: HOSPITAL | Age: 83
Discharge: HOME | End: 2018-01-01
Attending: INTERNAL MEDICINE
Payer: MEDICARE

## 2018-01-01 ENCOUNTER — OFFICE VISIT (OUTPATIENT)
Dept: PULMONOLOGY | Facility: CLINIC | Age: 83
End: 2018-01-01

## 2018-01-01 ENCOUNTER — OFFICE VISIT (OUTPATIENT)
Dept: PULMONOLOGY | Facility: CLINIC | Age: 83
End: 2018-01-01
Payer: MEDICARE

## 2018-01-01 ENCOUNTER — TELEPHONE (OUTPATIENT)
Dept: FAMILY MEDICINE CLINIC | Facility: CLINIC | Age: 83
End: 2018-01-01

## 2018-01-01 ENCOUNTER — TELEPHONE (OUTPATIENT)
Dept: PULMONOLOGY | Facility: CLINIC | Age: 83
End: 2018-01-01

## 2018-01-01 ENCOUNTER — HOSPITAL ENCOUNTER (OUTPATIENT)
Dept: CT IMAGING | Facility: HOSPITAL | Age: 83
Discharge: HOME | End: 2018-01-01
Attending: INTERNAL MEDICINE
Payer: MEDICARE

## 2018-01-01 ENCOUNTER — OFFICE VISIT (OUTPATIENT)
Dept: FAMILY MEDICINE CLINIC | Facility: CLINIC | Age: 83
End: 2018-01-01
Payer: MEDICARE

## 2018-01-01 ENCOUNTER — IMMUNIZATION (OUTPATIENT)
Dept: FAMILY MEDICINE CLINIC | Facility: CLINIC | Age: 83
End: 2018-01-01
Payer: MEDICARE

## 2018-01-01 ENCOUNTER — OFFICE VISIT (OUTPATIENT)
Dept: CARDIOLOGY CLINIC | Facility: CLINIC | Age: 83
End: 2018-01-01

## 2018-01-01 ENCOUNTER — APPOINTMENT (OUTPATIENT)
Dept: LAB | Age: 83
End: 2018-01-01
Attending: NURSE PRACTITIONER
Payer: MEDICARE

## 2018-01-01 ENCOUNTER — PATIENT OUTREACH (OUTPATIENT)
Dept: CASE MANAGEMENT | Age: 83
End: 2018-01-01

## 2018-01-01 ENCOUNTER — MED REC SCAN ONLY (OUTPATIENT)
Dept: FAMILY MEDICINE CLINIC | Facility: CLINIC | Age: 83
End: 2018-01-01

## 2018-01-01 ENCOUNTER — TELEPHONE (OUTPATIENT)
Dept: OTHER | Age: 83
End: 2018-01-01

## 2018-01-01 ENCOUNTER — TELEPHONE (OUTPATIENT)
Dept: INTERNAL MEDICINE CLINIC | Facility: CLINIC | Age: 83
End: 2018-01-01

## 2018-01-01 ENCOUNTER — OFFICE VISIT (OUTPATIENT)
Dept: FAMILY MEDICINE CLINIC | Facility: CLINIC | Age: 83
End: 2018-01-01

## 2018-01-01 ENCOUNTER — OFFICE VISIT (OUTPATIENT)
Dept: CARDIOLOGY CLINIC | Facility: CLINIC | Age: 83
End: 2018-01-01
Payer: MEDICARE

## 2018-01-01 VITALS
WEIGHT: 185 LBS | HEIGHT: 64 IN | RESPIRATION RATE: 16 BRPM | HEART RATE: 78 BPM | DIASTOLIC BLOOD PRESSURE: 77 MMHG | SYSTOLIC BLOOD PRESSURE: 149 MMHG | TEMPERATURE: 98 F | BODY MASS INDEX: 31.58 KG/M2

## 2018-01-01 VITALS
SYSTOLIC BLOOD PRESSURE: 137 MMHG | WEIGHT: 186.81 LBS | BODY MASS INDEX: 33.1 KG/M2 | DIASTOLIC BLOOD PRESSURE: 62 MMHG | HEIGHT: 63 IN | RESPIRATION RATE: 16 BRPM | HEART RATE: 87 BPM

## 2018-01-01 VITALS
RESPIRATION RATE: 20 BRPM | WEIGHT: 185 LBS | DIASTOLIC BLOOD PRESSURE: 67 MMHG | BODY MASS INDEX: 31.58 KG/M2 | TEMPERATURE: 98 F | HEIGHT: 64 IN | HEART RATE: 82 BPM | SYSTOLIC BLOOD PRESSURE: 114 MMHG

## 2018-01-01 VITALS
TEMPERATURE: 97 F | WEIGHT: 185 LBS | DIASTOLIC BLOOD PRESSURE: 63 MMHG | BODY MASS INDEX: 32.78 KG/M2 | OXYGEN SATURATION: 97 % | SYSTOLIC BLOOD PRESSURE: 120 MMHG | HEIGHT: 63 IN | HEART RATE: 68 BPM

## 2018-01-01 VITALS
WEIGHT: 188.38 LBS | SYSTOLIC BLOOD PRESSURE: 128 MMHG | OXYGEN SATURATION: 88 % | RESPIRATION RATE: 19 BRPM | DIASTOLIC BLOOD PRESSURE: 71 MMHG | HEIGHT: 64 IN | HEART RATE: 67 BPM | BODY MASS INDEX: 32.16 KG/M2

## 2018-01-01 VITALS
DIASTOLIC BLOOD PRESSURE: 64 MMHG | BODY MASS INDEX: 32.6 KG/M2 | SYSTOLIC BLOOD PRESSURE: 126 MMHG | RESPIRATION RATE: 18 BRPM | TEMPERATURE: 98 F | WEIGHT: 184 LBS | HEIGHT: 63 IN | HEART RATE: 89 BPM

## 2018-01-01 VITALS
HEART RATE: 76 BPM | BODY MASS INDEX: 32.07 KG/M2 | SYSTOLIC BLOOD PRESSURE: 126 MMHG | HEIGHT: 63 IN | DIASTOLIC BLOOD PRESSURE: 70 MMHG | WEIGHT: 181 LBS | RESPIRATION RATE: 18 BRPM

## 2018-01-01 VITALS
DIASTOLIC BLOOD PRESSURE: 64 MMHG | HEART RATE: 74 BPM | RESPIRATION RATE: 16 BRPM | SYSTOLIC BLOOD PRESSURE: 124 MMHG | WEIGHT: 187 LBS | BODY MASS INDEX: 33 KG/M2

## 2018-01-01 VITALS
SYSTOLIC BLOOD PRESSURE: 118 MMHG | WEIGHT: 184 LBS | RESPIRATION RATE: 20 BRPM | HEART RATE: 72 BPM | BODY MASS INDEX: 33 KG/M2 | DIASTOLIC BLOOD PRESSURE: 60 MMHG

## 2018-01-01 VITALS
SYSTOLIC BLOOD PRESSURE: 120 MMHG | HEART RATE: 68 BPM | BODY MASS INDEX: 33 KG/M2 | DIASTOLIC BLOOD PRESSURE: 68 MMHG | RESPIRATION RATE: 16 BRPM | WEIGHT: 185 LBS

## 2018-01-01 DIAGNOSIS — J06.9 ACUTE URI: ICD-10-CM

## 2018-01-01 DIAGNOSIS — R91.1 LUNG NODULE: ICD-10-CM

## 2018-01-01 DIAGNOSIS — I42.9 CARDIOMYOPATHY, UNSPECIFIED TYPE (HCC): Primary | ICD-10-CM

## 2018-01-01 DIAGNOSIS — J44.9 CHRONIC OBSTRUCTIVE PULMONARY DISEASE, UNSPECIFIED COPD TYPE (HCC): ICD-10-CM

## 2018-01-01 DIAGNOSIS — R91.1 PULMONARY NODULE: ICD-10-CM

## 2018-01-01 DIAGNOSIS — E78.5 DYSLIPIDEMIA: ICD-10-CM

## 2018-01-01 DIAGNOSIS — J96.11 CHRONIC HYPOXEMIC RESPIRATORY FAILURE (HCC): Primary | ICD-10-CM

## 2018-01-01 DIAGNOSIS — R91.1 LUNG NODULE: Primary | ICD-10-CM

## 2018-01-01 DIAGNOSIS — M54.2 NECK PAIN: ICD-10-CM

## 2018-01-01 DIAGNOSIS — I50.31 ACUTE DIASTOLIC CHF (CONGESTIVE HEART FAILURE) (HCC): Primary | ICD-10-CM

## 2018-01-01 DIAGNOSIS — J43.9 PULMONARY EMPHYSEMA, UNSPECIFIED EMPHYSEMA TYPE (HCC): ICD-10-CM

## 2018-01-01 DIAGNOSIS — R91.1 PULMONARY NODULE: Primary | ICD-10-CM

## 2018-01-01 DIAGNOSIS — I50.31 ACUTE DIASTOLIC CHF (CONGESTIVE HEART FAILURE) (HCC): ICD-10-CM

## 2018-01-01 DIAGNOSIS — J43.9 PULMONARY EMPHYSEMA, UNSPECIFIED EMPHYSEMA TYPE (HCC): Primary | ICD-10-CM

## 2018-01-01 DIAGNOSIS — I42.9 CARDIOMYOPATHY, UNSPECIFIED TYPE (HCC): ICD-10-CM

## 2018-01-01 DIAGNOSIS — J44.9 CHRONIC OBSTRUCTIVE PULMONARY DISEASE, UNSPECIFIED COPD TYPE (HCC): Primary | ICD-10-CM

## 2018-01-01 DIAGNOSIS — R09.02 HYPOXIA: ICD-10-CM

## 2018-01-01 DIAGNOSIS — I50.9 CONGESTIVE HEART FAILURE, UNSPECIFIED HF CHRONICITY, UNSPECIFIED HEART FAILURE TYPE (HCC): ICD-10-CM

## 2018-01-01 DIAGNOSIS — R42 DIZZINESS: ICD-10-CM

## 2018-01-01 PROCEDURE — 71250 CT THORAX DX C-: CPT | Performed by: INTERNAL MEDICINE

## 2018-01-01 PROCEDURE — 99213 OFFICE O/P EST LOW 20 MIN: CPT | Performed by: FAMILY MEDICINE

## 2018-01-01 PROCEDURE — 80053 COMPREHEN METABOLIC PANEL: CPT

## 2018-01-01 PROCEDURE — G0463 HOSPITAL OUTPT CLINIC VISIT: HCPCS | Performed by: INTERNAL MEDICINE

## 2018-01-01 PROCEDURE — 94729 DIFFUSING CAPACITY: CPT | Performed by: INTERNAL MEDICINE

## 2018-01-01 PROCEDURE — G0463 HOSPITAL OUTPT CLINIC VISIT: HCPCS | Performed by: FAMILY MEDICINE

## 2018-01-01 PROCEDURE — 99214 OFFICE O/P EST MOD 30 MIN: CPT | Performed by: FAMILY MEDICINE

## 2018-01-01 PROCEDURE — 99214 OFFICE O/P EST MOD 30 MIN: CPT | Performed by: INTERNAL MEDICINE

## 2018-01-01 PROCEDURE — 99213 OFFICE O/P EST LOW 20 MIN: CPT | Performed by: INTERNAL MEDICINE

## 2018-01-01 PROCEDURE — 90653 IIV ADJUVANT VACCINE IM: CPT | Performed by: FAMILY MEDICINE

## 2018-01-01 PROCEDURE — 94060 EVALUATION OF WHEEZING: CPT | Performed by: INTERNAL MEDICINE

## 2018-01-01 PROCEDURE — 36415 COLL VENOUS BLD VENIPUNCTURE: CPT

## 2018-01-01 PROCEDURE — 99213 OFFICE O/P EST LOW 20 MIN: CPT | Performed by: NURSE PRACTITIONER

## 2018-01-01 PROCEDURE — G0008 ADMIN INFLUENZA VIRUS VAC: HCPCS | Performed by: FAMILY MEDICINE

## 2018-01-01 PROCEDURE — 94761 N-INVAS EAR/PLS OXIMETRY MLT: CPT | Performed by: INTERNAL MEDICINE

## 2018-01-01 PROCEDURE — 80061 LIPID PANEL: CPT

## 2018-01-01 PROCEDURE — G0463 HOSPITAL OUTPT CLINIC VISIT: HCPCS | Performed by: NURSE PRACTITIONER

## 2018-01-01 PROCEDURE — 94726 PLETHYSMOGRAPHY LUNG VOLUMES: CPT | Performed by: INTERNAL MEDICINE

## 2018-01-01 RX ORDER — IPRATROPIUM BROMIDE AND ALBUTEROL SULFATE 2.5; .5 MG/3ML; MG/3ML
3 SOLUTION RESPIRATORY (INHALATION) 2 TIMES DAILY
Qty: 60 VIAL | Refills: 5 | Status: SHIPPED | OUTPATIENT
Start: 2018-01-01 | End: 2018-01-01

## 2018-01-01 RX ORDER — METOPROLOL SUCCINATE 50 MG/1
50 TABLET, EXTENDED RELEASE ORAL
Qty: 90 TABLET | Refills: 1 | Status: SHIPPED | OUTPATIENT
Start: 2018-01-01 | End: 2018-01-01

## 2018-01-01 RX ORDER — AMOXICILLIN 875 MG/1
875 TABLET, COATED ORAL 2 TIMES DAILY
Qty: 20 TABLET | Refills: 0 | Status: SHIPPED | OUTPATIENT
Start: 2018-01-01 | End: 2018-01-01 | Stop reason: ALTCHOICE

## 2018-01-01 RX ORDER — OMEPRAZOLE 20 MG/1
CAPSULE, DELAYED RELEASE ORAL
Qty: 90 CAPSULE | Refills: 0 | Status: SHIPPED | OUTPATIENT
Start: 2018-01-01

## 2018-01-01 RX ORDER — HYDRALAZINE HYDROCHLORIDE 25 MG/1
25 TABLET, FILM COATED ORAL EVERY 8 HOURS SCHEDULED
Qty: 90 TABLET | Refills: 5 | Status: SHIPPED | OUTPATIENT
Start: 2018-01-01

## 2018-01-01 RX ORDER — FUROSEMIDE 20 MG/1
20 TABLET ORAL DAILY
Qty: 90 TABLET | Refills: 1 | Status: SHIPPED | OUTPATIENT
Start: 2018-01-01 | End: 2018-01-01

## 2018-01-01 RX ORDER — IPRATROPIUM BROMIDE AND ALBUTEROL SULFATE 2.5; .5 MG/3ML; MG/3ML
3 SOLUTION RESPIRATORY (INHALATION) 2 TIMES DAILY
Qty: 540 ML | Refills: 5 | Status: SHIPPED | OUTPATIENT
Start: 2018-01-01

## 2018-01-01 RX ORDER — FUROSEMIDE 20 MG/1
20 TABLET ORAL DAILY
Qty: 90 TABLET | Refills: 0 | Status: SHIPPED | OUTPATIENT
Start: 2018-01-01

## 2018-01-01 RX ORDER — METOPROLOL SUCCINATE 50 MG/1
50 TABLET, EXTENDED RELEASE ORAL
Qty: 90 TABLET | Refills: 0 | Status: SHIPPED | OUTPATIENT
Start: 2018-01-01

## 2018-01-01 RX ORDER — FINASTERIDE 5 MG/1
5 TABLET, FILM COATED ORAL DAILY
Qty: 90 TABLET | Refills: 1 | Status: SHIPPED | OUTPATIENT
Start: 2018-01-01

## 2018-01-02 ENCOUNTER — HOSPITAL ENCOUNTER (INPATIENT)
Facility: HOSPITAL | Age: 83
LOS: 1 days | Discharge: HOME | DRG: 291 | End: 2018-01-03
Admitting: HOSPITALIST
Payer: MEDICARE

## 2018-01-02 ENCOUNTER — APPOINTMENT (OUTPATIENT)
Dept: GENERAL RADIOLOGY | Facility: HOSPITAL | Age: 83
DRG: 291 | End: 2018-01-02
Payer: MEDICARE

## 2018-01-02 ENCOUNTER — OFFICE VISIT (OUTPATIENT)
Dept: FAMILY MEDICINE CLINIC | Facility: CLINIC | Age: 83
End: 2018-01-02

## 2018-01-02 VITALS
TEMPERATURE: 98 F | DIASTOLIC BLOOD PRESSURE: 61 MMHG | OXYGEN SATURATION: 85 % | SYSTOLIC BLOOD PRESSURE: 104 MMHG | HEART RATE: 71 BPM
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
## 2018-01-02 DIAGNOSIS — J18.9 NOSOCOMIAL PNEUMONIA: Primary | ICD-10-CM

## 2018-01-02 DIAGNOSIS — I50.9 CONGESTIVE HEART FAILURE, UNSPECIFIED CONGESTIVE HEART FAILURE CHRONICITY, UNSPECIFIED CONGESTIVE HEART FAILURE TYPE: Primary | ICD-10-CM

## 2018-01-02 DIAGNOSIS — R09.02 HYPOXIA: ICD-10-CM

## 2018-01-02 LAB
ADENOVIRUS PCR:: NEGATIVE
ANION GAP SERPL CALC-SCNC: 9 MMOL/L (ref 0–18)
B PERT DNA SPEC QL NAA+PROBE: NEGATIVE
BASOPHILS # BLD: 0 K/UL (ref 0–0.2)
BASOPHILS NFR BLD: 0 %
BNP SERPL-MCNC: 93 PG/ML (ref 0–100)
BUN SERPL-MCNC: 22 MG/DL (ref 8–20)
BUN/CREAT SERPL: 15.1 (ref 10–20)
C PNEUM DNA SPEC QL NAA+PROBE: NEGATIVE
CALCIUM SERPL-MCNC: 9.6 MG/DL (ref 8.5–10.5)
CHLORIDE SERPL-SCNC: 100 MMOL/L (ref 95–110)
CO2 SERPL-SCNC: 29 MMOL/L (ref 22–32)
CORONAVIRUS 229E PCR:: NEGATIVE
CORONAVIRUS HKU1 PCR:: NEGATIVE
CORONAVIRUS NL63 PCR:: NEGATIVE
CORONAVIRUS OC43 PCR:: NEGATIVE
CREAT SERPL-MCNC: 1.46 MG/DL (ref 0.5–1.5)
EOSINOPHIL # BLD: 0.1 K/UL (ref 0–0.7)
EOSINOPHIL NFR BLD: 1 %
ERYTHROCYTE [DISTWIDTH] IN BLOOD BY AUTOMATED COUNT: 16.2 % (ref 11–15)
FLUAV RNA SPEC QL NAA+PROBE: NEGATIVE
FLUBV RNA SPEC QL NAA+PROBE: NEGATIVE
GLUCOSE SERPL-MCNC: 111 MG/DL (ref 70–99)
HCT VFR BLD AUTO: 38.4 % (ref 41–52)
HGB BLD-MCNC: 12.8 G/DL (ref 13.5–17.5)
LACTATE SERPL-SCNC: 1.3 MMOL/L (ref 0.5–2.2)
LYMPHOCYTES # BLD: 0.7 K/UL (ref 1–4)
LYMPHOCYTES NFR BLD: 8 %
MCH RBC QN AUTO: 30 PG (ref 27–32)
MCHC RBC AUTO-ENTMCNC: 33.4 G/DL (ref 32–37)
MCV RBC AUTO: 89.7 FL (ref 80–100)
METAPNEUMOVIRUS PCR:: NEGATIVE
MONOCYTES # BLD: 0.5 K/UL (ref 0–1)
MONOCYTES NFR BLD: 6 %
MYCOPLASMA PNEUMONIA PCR:: NEGATIVE
NEUTROPHILS # BLD AUTO: 6.9 K/UL (ref 1.8–7.7)
NEUTROPHILS NFR BLD: 84 %
NEUTS BAND NFR BLD: 1 %
OSMOLALITY UR CALC.SUM OF ELEC: 290 MOSM/KG (ref 275–295)
PARAINFLUENZA 1 PCR:: NEGATIVE
PARAINFLUENZA 2 PCR:: NEGATIVE
PARAINFLUENZA 3 PCR:: NEGATIVE
PARAINFLUENZA 4 PCR:: NEGATIVE
PLATELET # BLD AUTO: 157 K/UL (ref 140–400)
PMV BLD AUTO: 9 FL (ref 7.4–10.3)
POTASSIUM SERPL-SCNC: 3.9 MMOL/L (ref 3.3–5.1)
RBC # BLD AUTO: 4.28 M/UL (ref 4.5–5.9)
RHINOVIRUS/ENTERO PCR:: NEGATIVE
RSV RNA SPEC QL NAA+PROBE: NEGATIVE
SODIUM SERPL-SCNC: 138 MMOL/L (ref 136–144)
WBC # BLD AUTO: 8.1 K/UL (ref 4–11)

## 2018-01-02 PROCEDURE — 99223 1ST HOSP IP/OBS HIGH 75: CPT | Performed by: HOSPITALIST

## 2018-01-02 PROCEDURE — G0463 HOSPITAL OUTPT CLINIC VISIT: HCPCS | Performed by: FAMILY MEDICINE

## 2018-01-02 PROCEDURE — 99213 OFFICE O/P EST LOW 20 MIN: CPT | Performed by: FAMILY MEDICINE

## 2018-01-02 PROCEDURE — 71046 X-RAY EXAM CHEST 2 VIEWS: CPT

## 2018-01-02 RX ORDER — METOPROLOL SUCCINATE 50 MG/1
50 TABLET, EXTENDED RELEASE ORAL
Status: DISCONTINUED | OUTPATIENT
Start: 2018-01-03 | End: 2018-01-03

## 2018-01-02 RX ORDER — SODIUM CHLORIDE 0.9 % (FLUSH) 0.9 %
3 SYRINGE (ML) INJECTION AS NEEDED
Status: DISCONTINUED | OUTPATIENT
Start: 2018-01-02 | End: 2018-01-03

## 2018-01-02 RX ORDER — MECLIZINE HCL 12.5 MG/1
12.5 TABLET ORAL 3 TIMES DAILY PRN
Status: DISCONTINUED | OUTPATIENT
Start: 2018-01-02 | End: 2018-01-03

## 2018-01-02 RX ORDER — ACETAMINOPHEN 325 MG/1
650 TABLET ORAL EVERY 6 HOURS PRN
Status: DISCONTINUED | OUTPATIENT
Start: 2018-01-02 | End: 2018-01-03

## 2018-01-02 RX ORDER — FUROSEMIDE 10 MG/ML
20 INJECTION INTRAMUSCULAR; INTRAVENOUS ONCE
Status: COMPLETED | OUTPATIENT
Start: 2018-01-02 | End: 2018-01-02

## 2018-01-02 RX ORDER — ONDANSETRON 2 MG/ML
4 INJECTION INTRAMUSCULAR; INTRAVENOUS EVERY 6 HOURS PRN
Status: DISCONTINUED | OUTPATIENT
Start: 2018-01-02 | End: 2018-01-03

## 2018-01-02 RX ORDER — HYDRALAZINE HYDROCHLORIDE 25 MG/1
25 TABLET, FILM COATED ORAL EVERY 8 HOURS SCHEDULED
Status: DISCONTINUED | OUTPATIENT
Start: 2018-01-02 | End: 2018-01-03

## 2018-01-02 RX ORDER — OMEPRAZOLE 20 MG/1
CAPSULE, DELAYED RELEASE ORAL
Refills: 0 | COMMUNITY
Start: 2017-12-23 | End: 2018-01-11

## 2018-01-02 RX ORDER — ALFUZOSIN HYDROCHLORIDE 10 MG/1
10 TABLET, EXTENDED RELEASE ORAL
Status: DISCONTINUED | OUTPATIENT
Start: 2018-01-03 | End: 2018-01-03

## 2018-01-02 RX ORDER — PANTOPRAZOLE SODIUM 40 MG/1
40 TABLET, DELAYED RELEASE ORAL
Status: DISCONTINUED | OUTPATIENT
Start: 2018-01-03 | End: 2018-01-03

## 2018-01-02 RX ORDER — HEPARIN SODIUM 5000 [USP'U]/ML
5000 INJECTION, SOLUTION INTRAVENOUS; SUBCUTANEOUS EVERY 12 HOURS SCHEDULED
Status: DISCONTINUED | OUTPATIENT
Start: 2018-01-02 | End: 2018-01-03

## 2018-01-02 RX ORDER — FINASTERIDE 5 MG/1
5 TABLET, FILM COATED ORAL DAILY
Status: DISCONTINUED | OUTPATIENT
Start: 2018-01-03 | End: 2018-01-03

## 2018-01-02 RX ORDER — GABAPENTIN 400 MG/1
1200 CAPSULE ORAL 2 TIMES DAILY
Status: DISCONTINUED | OUTPATIENT
Start: 2018-01-02 | End: 2018-01-03

## 2018-01-02 RX ORDER — IPRATROPIUM BROMIDE AND ALBUTEROL SULFATE 2.5; .5 MG/3ML; MG/3ML
3 SOLUTION RESPIRATORY (INHALATION) EVERY 6 HOURS PRN
Status: DISCONTINUED | OUTPATIENT
Start: 2018-01-02 | End: 2018-01-03

## 2018-01-02 NOTE — ED PROVIDER NOTES
Patient Seen in: [facility] AND [facility] Emergency Department    History   Patient presents with:  Dyspnea DONTE SOB (respiratory)    Stated Complaint:     HPI    49-year-old male presents for complaint of worsening pneumonia.   He was seen at his primary care pr %  O2 Device: Nasal cannula    Current:/57   Pulse 74   Temp 98.2 °F (36.8 °C) (Oral)   Resp 20   Ht 165.1 cm (5' 5\")   Wt 81.6 kg   SpO2 98%   BMI 29.95 kg/m²         Physical Exam   Constitutional: He is oriented to person, place, and time.  He darrell Please view results for these tests on the individual orders.    LACTIC ACID, PLASMA   RAINBOW DRAW BLUE   RAINBOW DRAW LAVENDER   RAINBOW DRAW DARK GREEN   RAINBOW DRAW LIGHT GREEN   RAINBOW DRAW GOLD   RAINBOW DRAW LAVENDER TALL (BNP)   RESPIRATORY PA Normal 98% on 2L NC                 Admission disposition: 1/2/2018  5:44 PM           Disposition and Plan     Clinical Impression:  Nosocomial pneumonia  (primary encounter diagnosis)  Hypoxia    Disposition:  Admit  1/2/2018  5:44 pm    Follow-up:  No f

## 2018-01-02 NOTE — PROGRESS NOTES
HPI:    Patient ID: John Bond is a 80year old male. Pt presents for recent stay at [facility] for rehab for CHF and hypoxia. Pt was hospitalized for CHF in November. Pt was also diagnosed with ? Pneumonia at end of his stay at [facility].  Pt has CXR done that w 0.4 MG Oral Cap Take  by mouth nightly. Disp:  Rfl:      Allergies:No Known Allergies   PHYSICAL EXAM:   Physical Exam   Constitutional: He appears well-developed and well-nourished.    HENT:   Right Ear: Tympanic membrane and ear canal normal.   Left Ear:

## 2018-01-02 NOTE — ED INITIAL ASSESSMENT (HPI)
Pt to ED sent by Dr. Alan Christine for pneumonia. Pt has been on PO antibiotics with no improvement. Low O2 sats at home.

## 2018-01-03 ENCOUNTER — APPOINTMENT (OUTPATIENT)
Dept: NUCLEAR MEDICINE | Facility: HOSPITAL | Age: 83
DRG: 291 | End: 2018-01-03
Attending: HOSPITALIST
Payer: MEDICARE

## 2018-01-03 VITALS
DIASTOLIC BLOOD PRESSURE: 40 MMHG | TEMPERATURE: 98 F | BODY MASS INDEX: 30.68 KG/M2 | WEIGHT: 184.13 LBS | SYSTOLIC BLOOD PRESSURE: 115 MMHG | OXYGEN SATURATION: 93 % | HEIGHT: 65 IN | RESPIRATION RATE: 18 BRPM | HEART RATE: 73 BPM

## 2018-01-03 LAB
ANION GAP SERPL CALC-SCNC: 9 MMOL/L (ref 0–18)
BASOPHILS # BLD: 0 K/UL (ref 0–0.2)
BASOPHILS NFR BLD: 1 %
BUN SERPL-MCNC: 19 MG/DL (ref 8–20)
BUN/CREAT SERPL: 13.8 (ref 10–20)
CALCIUM SERPL-MCNC: 9 MG/DL (ref 8.5–10.5)
CHLORIDE SERPL-SCNC: 103 MMOL/L (ref 95–110)
CO2 SERPL-SCNC: 26 MMOL/L (ref 22–32)
CREAT SERPL-MCNC: 1.38 MG/DL (ref 0.5–1.5)
EOSINOPHIL # BLD: 0.1 K/UL (ref 0–0.7)
EOSINOPHIL NFR BLD: 1 %
ERYTHROCYTE [DISTWIDTH] IN BLOOD BY AUTOMATED COUNT: 16.1 % (ref 11–15)
GLUCOSE SERPL-MCNC: 111 MG/DL (ref 70–99)
HCT VFR BLD AUTO: 37.3 % (ref 41–52)
HGB BLD-MCNC: 12.5 G/DL (ref 13.5–17.5)
LYMPHOCYTES # BLD: 0.7 K/UL (ref 1–4)
LYMPHOCYTES NFR BLD: 8 %
MAGNESIUM SERPL-MCNC: 2.1 MG/DL (ref 1.8–2.5)
MCH RBC QN AUTO: 30 PG (ref 27–32)
MCHC RBC AUTO-ENTMCNC: 33.4 G/DL (ref 32–37)
MCV RBC AUTO: 89.6 FL (ref 80–100)
MONOCYTES # BLD: 1.2 K/UL (ref 0–1)
MONOCYTES NFR BLD: 13 %
NEUTROPHILS # BLD AUTO: 7 K/UL (ref 1.8–7.7)
NEUTROPHILS NFR BLD: 77 %
OSMOLALITY UR CALC.SUM OF ELEC: 289 MOSM/KG (ref 275–295)
PLATELET # BLD AUTO: 154 K/UL (ref 140–400)
PMV BLD AUTO: 9.3 FL (ref 7.4–10.3)
POTASSIUM SERPL-SCNC: 3.5 MMOL/L (ref 3.3–5.1)
RBC # BLD AUTO: 4.17 M/UL (ref 4.5–5.9)
SODIUM SERPL-SCNC: 138 MMOL/L (ref 136–144)
WBC # BLD AUTO: 9.1 K/UL (ref 4–11)

## 2018-01-03 PROCEDURE — 78582 LUNG VENTILAT&PERFUS IMAGING: CPT | Performed by: HOSPITALIST

## 2018-01-03 PROCEDURE — 99239 HOSP IP/OBS DSCHRG MGMT >30: CPT | Performed by: HOSPITALIST

## 2018-01-03 RX ORDER — POTASSIUM CHLORIDE 20 MEQ/1
40 TABLET, EXTENDED RELEASE ORAL EVERY 4 HOURS
Status: COMPLETED | OUTPATIENT
Start: 2018-01-03 | End: 2018-01-03

## 2018-01-03 NOTE — DISCHARGE SUMMARY
[place] HOSPITALIST  DISCHARGE SUMMARY     Leia Lozano Patient Status:  Inpatient    10/11/1928 MRN [id number]   Location [facility] 4W/SW/SE Attending No att. providers found   [facility] Day # 1 PCP Nola Archer MD     DATE OF ADMISSION: 20 infiltrate, there is no elevated white count or fever. BNP was low and chest x-ray did not show new pulmonary edema. There is a question of chronic interstitial edema though this was not appreciated on exam and he did not appear to be in heart failure. Tabs  Commonly known as:  LASIX      Take 1 tablet (20 mg total) by mouth daily.    Quantity:  30 tablet  Refills:  0     gabapentin 400 MG Caps  Commonly known as:  NEURONTIN      TAKE 3 CAPSULES(1200 MG) BY MOUTH TWICE DAILY   Quantity:  180 capsule  Refi failure  Hypertension  BPH  GERD  Neuropathy  Suspected COPD    TCM Diagnosis at discharge from Hospital:  Chronic Obstructive Pulmonary Disease    Please note that only patients enrolled in the Medicare ACO, [facility] ACO and [address] [place] [address] will be handled by a mem

## 2018-01-03 NOTE — H&P
[address] Mcknight Patient Status:  Inpatient    10/11/1928  80year old CSN [id number]   Location 423/423-A Attending Dandre Garcia MD     PCP Alesia Mcallister MD     ASSESSMENT/PLAN    Acute hypoxic res MEDICAL HISTORY  Past Medical History:   Diagnosis Date   • BPH (benign prostatic hyperplasia)    • CHF (congestive heart failure) (HCC)    • GERD (gastroesophageal reflux disease)    • Unspecified essential hypertension         PAST SURGICAL HISTORY  Past name:                       Years of education:                 Number of children:               Social History Main Topics    Smoking status: Former Smoker                                                                Packs/day: 0.00      Years: 0.00 rhonchi. Abd: Soft, NTND, BS normal, no mass, no HSM, no rebound/guarding.    Neuro: Normal reflexes, cranial nerves II through XII intact, strength is symmetric and 5 out of 5 throughout, sensory exam grossly intact, coordination and gait normal.  Skin: S

## 2018-01-03 NOTE — CM/SW NOTE
MD orders received regarding resume HHC. The pt. Is current with [facility]. SW notified [facility] of the discharge and plan to resume.       [phone]

## 2018-01-04 ENCOUNTER — TELEPHONE (OUTPATIENT)
Dept: MEDSURG UNIT | Facility: HOSPITAL | Age: 83
End: 2018-01-04

## 2018-01-04 ENCOUNTER — PATIENT OUTREACH (OUTPATIENT)
Dept: CASE MANAGEMENT | Age: 83
End: 2018-01-04

## 2018-01-04 ENCOUNTER — TELEPHONE (OUTPATIENT)
Dept: FAMILY MEDICINE CLINIC | Facility: CLINIC | Age: 83
End: 2018-01-04

## 2018-01-04 ENCOUNTER — TELEPHONE (OUTPATIENT)
Dept: INTERNAL MEDICINE UNIT | Facility: HOSPITAL | Age: 83
End: 2018-01-04

## 2018-01-04 NOTE — TELEPHONE ENCOUNTER
S/w patient for TCM. Patient dc'd from [address] [place] [address] 1/3, Acute hypoxic respiratory failure, probably chronic, COPD. He states that starting yesterday when he got home, the back of his right leg, best described as behind the knee started causing him a lot of pain.  H

## 2018-01-04 NOTE — TELEPHONE ENCOUNTER
Message noted and if any sig symptoms, to go urgent care or ER, can see one of other providers as well if needed. I will not be here this weekend or tomorrow and have blocked hours today due to family emergency.

## 2018-01-04 NOTE — TELEPHONE ENCOUNTER
Advised patient of Dr. Vincent Link note. Patient verbalized understanding.     Future Appointments       Provider Department Appt Notes    In 1 week Niurka Garcia MD [facility], [facility] [address] [facility] [facility]

## 2018-01-04 NOTE — PROGRESS NOTES
Initial Post Discharge Follow Up   Discharge Date: 1/3/18  Contact Date: 1/4/2018    Consent Verification:  Assessment Completed With: Patient  HIPAA Verified?   Yes    Discharge Dx:  Acute hypoxic respiratory failure, probably chronic   Chronic renal dax • Do you have any pain since discharge?  yes   If Yes:  o Where-Right leg, between knee and hip. Near knee in the back, sharp pain when walking. o Rating on pain scale-when walking a 10. And none when sitting down.    • When you were leaving the hospita [address], [facility]  [address] [address]  [phone]          PCP TCM/HFU appointment: scheduled at D/C within 7-14 days  no     NCM Reviewed/scheduled/rescheduled PCP TCM/HFU

## 2018-01-05 ENCOUNTER — TELEPHONE (OUTPATIENT)
Dept: FAMILY MEDICINE CLINIC | Facility: CLINIC | Age: 83
End: 2018-01-05

## 2018-01-05 NOTE — TELEPHONE ENCOUNTER
[facility] PT called states pt has cancelled therapy for today she will resume next week  Ana Bar states pt was in the hospital last week and has had 2 other ppl visit him today she states pt is just exhausted

## 2018-01-10 ENCOUNTER — TELEPHONE (OUTPATIENT)
Dept: OTHER | Age: 83
End: 2018-01-10

## 2018-01-10 NOTE — TELEPHONE ENCOUNTER
Dorothea Romero from [facility] home health called wanted to know if can continue home physical therapy 2 times per week for another 2 weeks. Please advise.      Please respond to pool: [facility] LPN/CMA

## 2018-01-11 ENCOUNTER — OFFICE VISIT (OUTPATIENT)
Dept: FAMILY MEDICINE CLINIC | Facility: CLINIC | Age: 83
End: 2018-01-11

## 2018-01-11 VITALS
WEIGHT: 184 LBS | HEART RATE: 74 BPM | BODY MASS INDEX: 30.66 KG/M2 | HEIGHT: 65 IN | RESPIRATION RATE: 16 BRPM | TEMPERATURE: 98 F | DIASTOLIC BLOOD PRESSURE: 59 MMHG | SYSTOLIC BLOOD PRESSURE: 105 MMHG

## 2018-01-11 DIAGNOSIS — I50.9 CONGESTIVE HEART FAILURE, UNSPECIFIED CONGESTIVE HEART FAILURE CHRONICITY, UNSPECIFIED CONGESTIVE HEART FAILURE TYPE: ICD-10-CM

## 2018-01-11 DIAGNOSIS — R06.89 RESPIRATORY INSUFFICIENCY: ICD-10-CM

## 2018-01-11 PROCEDURE — 99495 TRANSJ CARE MGMT MOD F2F 14D: CPT | Performed by: FAMILY MEDICINE

## 2018-01-11 RX ORDER — TAMSULOSIN HYDROCHLORIDE 0.4 MG/1
0.4 CAPSULE ORAL NIGHTLY
Qty: 30 CAPSULE | Refills: 5 | Status: SHIPPED | OUTPATIENT
Start: 2018-01-11 | End: 2018-01-01 | Stop reason: SINTOL

## 2018-01-11 RX ORDER — FUROSEMIDE 20 MG/1
20 TABLET ORAL DAILY
Qty: 30 TABLET | Refills: 0 | Status: SHIPPED | OUTPATIENT
Start: 2018-01-11 | End: 2018-01-31

## 2018-01-11 RX ORDER — HYDRALAZINE HYDROCHLORIDE 25 MG/1
25 TABLET, FILM COATED ORAL EVERY 8 HOURS SCHEDULED
Qty: 90 TABLET | Refills: 0 | Status: SHIPPED | OUTPATIENT
Start: 2018-01-11 | End: 2018-01-01

## 2018-01-11 RX ORDER — OMEPRAZOLE 20 MG/1
CAPSULE, DELAYED RELEASE ORAL
Qty: 30 CAPSULE | Refills: 5 | Status: SHIPPED | OUTPATIENT
Start: 2018-01-11 | End: 2018-01-01

## 2018-01-11 RX ORDER — FINASTERIDE 5 MG/1
5 TABLET, FILM COATED ORAL DAILY
Qty: 30 TABLET | Refills: 5 | Status: SHIPPED | OUTPATIENT
Start: 2018-01-11 | End: 2018-01-01

## 2018-01-11 RX ORDER — METOPROLOL SUCCINATE 50 MG/1
50 TABLET, EXTENDED RELEASE ORAL
Qty: 30 TABLET | Refills: 0 | Status: SHIPPED | OUTPATIENT
Start: 2018-01-11 | End: 2018-01-31

## 2018-01-11 NOTE — PROGRESS NOTES
HPI:    Mary Yoder is a 80year old male here today for hospital follow up.    He was discharged from Inpatient hospital, [facility] AND [facility]  to Home   Admission Date: 1/2/18   Discharge Date: 1/3/18  Hospital Discharge Diagnosis: CHF  TCM Diagnosis:  Ch Date of Service: 1/3/2018 1:27 PM  Teena Shaw MD   Hospitalist      []Manual[]Template  []Copied  [facility] [place] HOSPITALIST  DISCHARGE SUMMARY            Kyra Webster Patient Status:  Inpatient    10/11/1928 MRN [id number]   [facility] VQ scan was done to rule out PE. Patient was asymptomatic with or without oxygen on. In fact for a while his oxygen was off and he did not even realize it for several hours. At the time of discharge his room air sat was 89% and again asymptomatic.   I coto DISCHARGE MEDICATIONS            Discharge Medications             CHANGE how you take these medications      Instructions Prescription details   omeprazole 20 MG Cpdr  Commonly known as:  PRILOSEC  What changed:  Another medication with the same name was [address] [address]  [phone]     In 1 week  pulmonology     The above plan and follow-up instructions were reviewed with the patient and they verbalized understanding and agreement.   They understand to return to the emergency room for any concerning sig He  has a past medical history of BPH (benign prostatic hyperplasia); CHF (congestive heart failure) ([facility] [address] GERD (gastroesophageal reflux disease); and Unspecified essential hypertension.     He  has a past surgical history that includes surgery - dmg; hip Congestive heart failure, unspecified congestive heart failure chronicity, diastolic dysfunction- After discussion, will send to cardiology for follow up and treatment; To call if any significant symptoms. Refilled medications as requested.  Call if any sym · Amount and/or Complexity of Data to Be Reviewed: moderate  · Risk of Significant Complications, Morbidity, and/or Mortality: moderate    Overall Risk:   moderate    Patient seen within 7 days from date of discharge.      Spencer Goodpasture, MD, 1/11/2018

## 2018-01-15 RX ORDER — METOPROLOL SUCCINATE 50 MG/1
TABLET, EXTENDED RELEASE ORAL
Qty: 90 TABLET | Refills: 0 | OUTPATIENT
Start: 2018-01-15

## 2018-01-15 RX ORDER — HYDRALAZINE HYDROCHLORIDE 25 MG/1
TABLET, FILM COATED ORAL
Qty: 270 TABLET | Refills: 0 | OUTPATIENT
Start: 2018-01-15

## 2018-01-18 ENCOUNTER — TELEPHONE (OUTPATIENT)
Dept: FAMILY MEDICINE CLINIC | Facility: CLINIC | Age: 83
End: 2018-01-18

## 2018-01-18 NOTE — TELEPHONE ENCOUNTER
That would be fine to continue skilled nursing; if any sig symptoms to follow up for visit and also with specialists as recommended at office visit

## 2018-01-18 NOTE — TELEPHONE ENCOUNTER
Yris/[facility] called stating she is noticing crackles during respirations to the left lower lobe. She would also like to continue skilled nursing for him for the next 4 weeks due to COPD, oxygen dependency.  GISELL BAER [facility] is requesting an order reflecting this reques

## 2018-01-22 ENCOUNTER — TELEPHONE (OUTPATIENT)
Dept: FAMILY MEDICINE CLINIC | Facility: CLINIC | Age: 83
End: 2018-01-22

## 2018-01-22 NOTE — TELEPHONE ENCOUNTER
Nixon Gonzalez from [facility] is requesting verbal order to continue OT twice a week x2 for strengthening . Please advise.

## 2018-01-29 ENCOUNTER — TELEPHONE (OUTPATIENT)
Dept: CARDIOLOGY CLINIC | Facility: CLINIC | Age: 83
End: 2018-01-29

## 2018-01-29 NOTE — TELEPHONE ENCOUNTER
Pt daughter calling [facility]) verifying an appointment that was scheduled by the HF clinic for them to see Josette Cazares on 1/31. Apt had been canceled d/t office seeing patient was in the hospital.  Put patient back on the schedule.   Daughter verbalized understandin

## 2018-01-31 ENCOUNTER — OFFICE VISIT (OUTPATIENT)
Dept: CARDIOLOGY CLINIC | Facility: CLINIC | Age: 83
End: 2018-01-31

## 2018-01-31 ENCOUNTER — TELEPHONE (OUTPATIENT)
Dept: CARDIOLOGY CLINIC | Facility: CLINIC | Age: 83
End: 2018-01-31

## 2018-01-31 VITALS
SYSTOLIC BLOOD PRESSURE: 128 MMHG | BODY MASS INDEX: 30.82 KG/M2 | HEIGHT: 65 IN | WEIGHT: 185 LBS | RESPIRATION RATE: 22 BRPM | DIASTOLIC BLOOD PRESSURE: 58 MMHG | HEART RATE: 72 BPM

## 2018-01-31 DIAGNOSIS — I50.31 ACUTE DIASTOLIC CHF (CONGESTIVE HEART FAILURE) (HCC): Primary | ICD-10-CM

## 2018-01-31 PROCEDURE — 99214 OFFICE O/P EST MOD 30 MIN: CPT | Performed by: NURSE PRACTITIONER

## 2018-01-31 PROCEDURE — G0463 HOSPITAL OUTPT CLINIC VISIT: HCPCS | Performed by: NURSE PRACTITIONER

## 2018-01-31 RX ORDER — METOPROLOL SUCCINATE 50 MG/1
50 TABLET, EXTENDED RELEASE ORAL
Qty: 90 TABLET | Refills: 0 | Status: SHIPPED | OUTPATIENT
Start: 2018-01-31 | End: 2018-01-01

## 2018-01-31 RX ORDER — FUROSEMIDE 20 MG/1
20 TABLET ORAL DAILY
Qty: 90 TABLET | Refills: 0 | Status: SHIPPED | OUTPATIENT
Start: 2018-01-31 | End: 2018-01-01

## 2018-01-31 RX ORDER — HYDRALAZINE HYDROCHLORIDE 25 MG/1
25 TABLET, FILM COATED ORAL EVERY 8 HOURS SCHEDULED
Qty: 90 TABLET | Refills: 0 | Status: CANCELLED | OUTPATIENT
Start: 2018-01-31

## 2018-01-31 NOTE — PROGRESS NOTES
Kelley Balderrama is a 80year old male. Patient presents with:  Hospital F/U  CHF  Dyspnea: on exertion    HPI:   Patient comes in today for a checkup for CHF.   He sees Dr. Madrid Poster as his primary he was in the hospital in November was diagnosed with heart failure h MG Oral Tab TAKE 1 TABLET(12.5 MG) BY MOUTH THREE TIMES DAILY AS NEEDED Disp: 30 tablet Rfl: 0   GABAPENTIN 400 MG Oral Cap TAKE 3 CAPSULES(1200 MG) BY MOUTH TWICE DAILY (Patient taking differently: TAKE 3 CAPSULES(1200 MG) BY MOUTH THREE TIMES A DAY) Disp hydralazine not sure if this would be any benefit to his heart since he only takes it once a day he might not even need this medication for blood pressure will have him see Dr. Blake Olmos in 2-3 weeks.   He will continue to weigh himself daily limit the salt

## 2018-01-31 NOTE — TELEPHONE ENCOUNTER
May continue to take hydralazine once a day since BPs are ok but may change this at [facility] appt

## 2018-01-31 NOTE — TELEPHONE ENCOUNTER
Pts daugh calling to let APN know that he takes Lasix and Metoprolol - 1 pill daily. Hydralazine is take 1 tab every 8 hours, but he has been taking it once a day, as pt. told you at todays appt. , per RN's instruction at Dr Faith Allen office.

## 2018-01-31 NOTE — TELEPHONE ENCOUNTER
Called daughter and informed Kate's instructions below. Verbalized understanding. No further questions or concerns.

## 2018-02-06 NOTE — PROGRESS NOTES
Referring Physician  Claritza Saul MD    History of Present Illness  Patient is a 80-year-old  male who presents the pulmonary clinic after hospitalization late November 2017 for worsening dyspnea.   Patient with evidence of CHF exacerbation, emph clear to auscultation bilaterally, no wheezing, rales, rhonchi, crackles  GI: abdomen soft, non tender  Extremities: no clubbing, cyanosis, edema  Neurologic: no gross motor deficits  Skin: warm, dry  Lymphatic: no supraclavicular lymphadenopathy     Imagi

## 2018-02-16 NOTE — TELEPHONE ENCOUNTER
Order, face sheet, and OV notes faxed to [facility] [phone]. Fax confirmation received. Pt notified of this and to call Alessandro Hou [phone] to get Nebulizer machine.

## 2018-02-16 NOTE — TELEPHONE ENCOUNTER
Called and spoke with pt, answered his question regarding CT Chest order notice. Informed pt that our office will mail out reminder (order) the month prior to when pt should schedule CT.   Pt stated he received medication for nebulizer but when he contacte

## 2018-02-16 NOTE — TELEPHONE ENCOUNTER
Pt requesting to spk w RN about obtaining information on his nebulizer. Pls call pt at:[phone],thanks.   *pt also indicates he has a question about his ct scan

## 2018-02-20 NOTE — PROGRESS NOTES
Dulce Gallego is a 80year old male. Patient presents with:   Follow - Up: Medication makes him dizzy - Tamsulosin    HPI:   This is a pleasant 05-APHT-ZEZ with diastolic heart failure COPD chronic renal disease and hypertension who presents for follow-up p • GERD (gastroesophageal reflux disease)    • Unspecified essential hypertension       Social History:  Smoking status: Former Smoker                                                              Packs/day: 0.00      Years: 0.00      Smokeless tobacco: Fo patient is asked to return in see ANJU in 1 month and Toni in 3 months             Manette Dance, MD  2/20/2018  2:56 PM

## 2018-02-20 NOTE — PATIENT INSTRUCTIONS
Continue same medicines and call if changes  See ANJU Love in 1 month and Dr. Hector Tellez in 3 months

## 2018-02-21 NOTE — TELEPHONE ENCOUNTER
Evelyne from [place] states she sent over certificate of medical necessity to be signed and dated.  States she has not received please fax to Fax [phone]   Attention Evelyne   Direct line [phone]   Thank you

## 2018-02-21 NOTE — TELEPHONE ENCOUNTER
Spoke to Lyudmila and notified her that we have not received CMN and to refax it to [facility] fax due our fax being repaired [phone].

## 2018-02-23 NOTE — TELEPHONE ENCOUNTER
[facility]  [phone] ext [phone] calling to advise Dr. Anastasiia Lennon that the Pt is requesting Palliative Care and she will be faxing over a form today 2/23, that needs to be completed and an order will need to be faxed to [phone]: [facility] for the Pt to get this service.

## 2018-03-14 NOTE — PROGRESS NOTES
Outreached to patient in regards to enrollment to Chronic Care Management program. Left message for patient to return my call at ext. [phone]. Thank you.

## 2018-03-20 NOTE — PROGRESS NOTES
Francis York is a 80year old male. Patient presents with: Follow - Up  Dyspnea: on exertion, uses oxygen as needed  CHF    HPI:    Patient comes in today for a checkup for his diastolic heart failure COPD renal disease hypertension.   He was last seen by History:   Diagnosis Date   • BPH (benign prostatic hyperplasia)    • CHF (congestive heart failure) (HCC)    • GERD (gastroesophageal reflux disease)    • Unspecified essential hypertension       Social History:  Smoking status: Former Smoker ANJU  3/20/2018  11:30 AM

## 2018-03-21 NOTE — PROGRESS NOTES
Outreached to patient a second time in regards to enrollment to Chronic Care Management program. Patient stated that he has a nurse that comes out once a month and at this time not interested.      Patient identified with a potential need for Chronic Care M

## 2018-04-03 NOTE — TELEPHONE ENCOUNTER
Home health message noted: Chart reviewed and may refill medication as requested times one with 1 additional refills. Prescription sent to listed pharmacy. Please notify patient.

## 2018-04-03 NOTE — TELEPHONE ENCOUNTER
Per Shelbi pt has a rash on the groin area and she refilled his triamcinolone cream. Pt also takes finasteride medication 5 milligrams and takes 1 tablet daily.  Pt is no longer going to the [place] for that and pt is requesting a 90 day supply to be phoned into [facility]

## 2018-04-24 NOTE — PROGRESS NOTES
HPI:    Patient ID: John Bond is a 80year old male. Pt presents with intermittent cough and cold symptoms for 3 weeks. Pt has had cough, sore throat. No fevers. Pt has tried otc remedies without relief. Pt states no sick contacts.    Pt has had a h 180 capsule Rfl: 1   triamcinolone acetonide 0.1 % External Cream Twice daily as directed Disp: 80 g Rfl: 1     Allergies:No Known Allergies   PHYSICAL EXAM:   Physical Exam   Constitutional: He appears well-developed and well-nourished.    HENT:   Right Ea

## 2018-05-10 NOTE — TELEPHONE ENCOUNTER
Chart and medications reviewed and should be ok to have his molar taken out without any special instructions.

## 2018-05-10 NOTE — TELEPHONE ENCOUNTER
Dentist recommended patient see the oral surgeon on 5/15/18 to have molar removed. Dentist stated to check with PCP because of patient's CHF if needs to be premedicated prior or if any restrictions or instruction prior to surgery? Please advise.

## 2018-05-21 NOTE — TELEPHONE ENCOUNTER
Pt informed of Dr. Becerril  message below. Pt verbalized understanding. Order added to chronic calendar.

## 2018-05-21 NOTE — TELEPHONE ENCOUNTER
----- Message from Harry Palacios DO sent at 5/17/2018 12:52 PM CDT -----  You may let the patient know that I reviewed CT chest results which reveal evidence of stable appearing dominant nodule.   I recommend repeat CT chest in one year duration in May

## 2018-05-22 NOTE — PROGRESS NOTES
Lm Mortensen is a 80year old male. Patient presents with: Follow - Up: Shortness of breath with activity    HPI:   This is a pleasant 59-TKBY-FZF with diastolic heart failure COPD renal insufficiency and hypertension who presents for follow-up.   Since h failure) ([facility]    • GERD (gastroesophageal reflux disease)    • Unspecified essential hypertension       Social History:  Smoking status: Former Smoker                                                              Packs/day: 0.00      Years: 0.00      Smoke plan.  The patient is asked to return in 6 months.              José Manuel Hadley MD  5/22/2018  1:18 PM

## 2018-06-12 NOTE — PROGRESS NOTES
Referring Physician  Aurora Cardenas MD    History of Present Illness  Patient is a 29-year-old  male who presents the pulmonary clinic for follow-up visit. He denies any significant change in Ray overall dyspnea with exertion.   He has been compl performed on 1/3/2018 revealed no significant abnormalities. Assessment  1. Emphysema  2. Chronic hypoxemic respiratory failure  3. Pulmonary fibrosis  4. Right lung nodule  5. Pulmonary hypertension  6. Chronic diastolic heart failure  7.

## 2018-06-24 NOTE — PROCEDURES
[facility]    Patient's Name John Bond MRN [id number]    10/11/1928 Pulmonologist Denisse Douglas MD   Location [facility] PCP Ingrid Buckley MD     IMPRESSION:    The PFTs are Abnormal.    There is no airw

## 2018-06-24 NOTE — ADDENDUM NOTE
Encounter addended by: Adali Davila MD on: 6/24/2018  3:16 PM<BR>    Actions taken: Sign clinical note, Charge Capture section accepted

## 2018-07-10 NOTE — ADDENDUM NOTE
Encounter addended by: Wolfgang Fraser DO on: 7/10/2018  3:21 PM<BR>    Actions taken: Results reviewed in IB

## 2018-08-02 NOTE — TELEPHONE ENCOUNTER
Pt daughter informed pt would need f2f and amb ox in order to order a POC, next available appt offered 9/4 @ 11:30 am in [facility] K[facility]STIAN, daughter accepted.

## 2018-08-28 NOTE — PROGRESS NOTES
HPI:    Patient ID: Bridget Awad is a 80year old male. Pt presents with some dizziness over the last couple weeks. No spinning/ vertigo. Symptoms began after using lidocaine topical for some pain of the neck area/ arthritis.  Pt was placing on back of Rfl: 5   omeprazole 20 MG Oral Capsule Delayed Release TK ONE C PO D (Patient taking differently: Take 20 mg by mouth as needed.  TK ONE C PO D ) Disp: 30 capsule Rfl: 5   MECLIZINE HCL 12.5 MG Oral Tab TAKE 1 TABLET(12.5 MG) BY MOUTH THREE TIMES DAILY AS N

## 2018-10-02 NOTE — TELEPHONE ENCOUNTER
No Ramirez requesting RN to reach out to [facility]. States pt rec'vd letter from [facility] requesting pt to obtain CMN for oxygen supplies. Pls call. Thank you.

## 2018-10-04 NOTE — TELEPHONE ENCOUNTER
Lisa Hill at [facility], she is out of the office until Monday. Was informed by Jan Suazo is also working with this pt. Sunit states Estephania Fine is at lunch and when he transferred me to Estephania Fine call dropped twice. Will call Estephania Fine later.

## 2018-10-04 NOTE — TELEPHONE ENCOUNTER
Daughter informed of below. Appt made for 11/6/18 10:50 am.  Daughter informed of time date and place of appt. Daughter verbalized understanding.

## 2018-10-04 NOTE — TELEPHONE ENCOUNTER
Valorie from [address] [place] [address] states pt needs O2 re-certification for Oxygen. [facility] pt started O2 December 2017. Pt requesting this office call his daughter Kristin Lanza.

## 2018-11-06 NOTE — PROGRESS NOTES
Referring Physician  Cathy Anderson MD    History of Present Illness  Patient is a 27-year-old  male who presents the pulmonary clinic for follow-up visit. He denies any significant change in his overall dyspnea with exertion.   He has been compli deficits  Skin: warm, dry  Lymphatic: no supraclavicular lymphadenopathy     Imaging  VQ scan performed on 1/3/2018 revealed no significant abnormalities. Assessment  1. Emphysema  2. Chronic hypoxemic respiratory failure  3. Pulmonary fibrosis  4.

## 2018-11-15 NOTE — TELEPHONE ENCOUNTER
Hypertensive Medications  Protocol Criteria:  · Appointment scheduled with Cardiology in the past 12 months or in the next 3 months  · BMP or CMP in the past 12 months  · Potassium: 3.1 - 4.9 mmol/L  · Creatinine is normal or increase is less than 30% from

## 2018-11-15 NOTE — TELEPHONE ENCOUNTER
Current Outpatient Medications:   •  furosemide 20 MG Oral Tab, Take 1 tablet (20 mg total) by mouth daily. , Disp: 90 tablet, Rfl: 1  •  Metoprolol Succinate ER 50 MG Oral Tablet 24 Hr, Take 1 tablet (50 mg total) by mouth Daily Beta Blocker. , Disp: 90 t

## 2018-11-20 NOTE — PROGRESS NOTES
Dulce Gallego is a 80year old male. Patient presents with:   Follow - Up  Congestive Heart Failure (cardiovascular)  Dyspnea: on exertion, hx emphysema  Dizziness: occasional vertigo    HPI:   This is a pleasant 72-HRQZ-RSX with diastolic heart failure  (gastroesophageal reflux disease)    • Unspecified essential hypertension       Social History:  Social History    Tobacco Use      Smoking status: Former Smoker        Packs/day: 1.00        Years: 30.00        Pack years: 30        Types: Cigarettes 6 months.              Clare Silva MD  11/20/2018  1:22 PM

## 2018-11-20 NOTE — TELEPHONE ENCOUNTER
Refill passed per [facility] protocol  Refill Protocol Appointment Criteria  · Appointment scheduled in the past 6 months or in the next 3 months  Recent Outpatient Visits            Today Cardiomyopathy, unspecified type [facility])    [facility]

## 2019-01-01 ENCOUNTER — HOSPITAL ENCOUNTER (EMERGENCY)
Facility: HOSPITAL | Age: 84
End: 2019-01-01
Attending: EMERGENCY MEDICINE
Payer: MEDICARE

## 2019-01-01 ENCOUNTER — TELEPHONE (OUTPATIENT)
Dept: PULMONOLOGY | Facility: CLINIC | Age: 84
End: 2019-01-01

## 2019-01-01 DIAGNOSIS — I46.9 CARDIOPULMONARY ARREST (HCC): Primary | ICD-10-CM

## 2019-01-01 PROCEDURE — 92950 HEART/LUNG RESUSCITATION CPR: CPT

## 2019-01-01 PROCEDURE — 99285 EMERGENCY DEPT VISIT HI MDM: CPT

## 2019-01-29 NOTE — TELEPHONE ENCOUNTER
AUGUSTA Bailey/AC called to let this office know that she will be faxing over CMN for re certifying pt for oxygen.

## 2019-02-04 NOTE — CODE DOCUMENTATION
PATIENT ARRIVES VIA EMS FROM [facility] --LAST SEEN WELL ABOUT 1 HOUR AGO  ASYSTOLE UPON MEDIC ARRIVAL  ALBIN IN PROGRESS, RECEIVED 4 EPI PTA  PATIENT INTUBATED BY MEDICS  18 G LEFT AC

## 2019-02-04 NOTE — ED NOTES
Given  home. Betzy Escobedo on  and alec in [place]. Will notify prior to patient going to [facility] [place], [place].

## 2019-02-04 NOTE — ED PROVIDER NOTES
Patient Seen in: [facility] AND [facility] Emergency Department    History   Patient presents with:  Cardiac Arrest (cardiovascular)    Stated Complaint: FULL ARREST    HPI    59-year-old male with history listed below and an assisted living facility seen normal crepitus. Cardiovascular: No spontaneous pulse pulmonary/Chest: Effort normal. No respiratory distress. Abdominal: Soft. Obese. Nonrigid. Nondistended. Musculoskeletal: No visible signs of trauma.     Neurological: Unresponsive  Skin: Skin is cool an Discharge Medication List

## 2019-02-04 NOTE — ED NOTES
Pt family in consult room. Do not want to see patient at this time. But request patient to stay in room at this time.

## 2019-02-05 ENCOUNTER — TELEPHONE (OUTPATIENT)
Dept: FAMILY MEDICINE CLINIC | Facility: CLINIC | Age: 84
End: 2019-02-05

## 2019-02-05 NOTE — TELEPHONE ENCOUNTER
Sophie Akhtar from [facility] called to advise Pt past away on 2/4/2019    Was calling to see if PCP will signed the death certificate

## 2020-08-29 NOTE — PATIENT INSTRUCTIONS
1.Call to clarify hydralazine   2. Continue to weigh daily and call if wt goes up by 3 lbs in 1 day or 5 lbs in 1 wk  3.  Follow up with Dr. Richard Reich 2-3 wks SEIZURES

## 2021-11-01 NOTE — IP AVS SNAPSHOT
[facility]            (For Outpatient Use Only) Initial Admit Date: 11/21/2017   Inpt/Obs Admit Date: Inpt: 11/21/17 / Obs: N/A   Discharge Date:    Clifton Barry:  [de-identified]   MRN: [de-identified]   CSN: [id number]        ENCOUNTER  Patient Cla Subscriber ID:  Pt Rel to Subscriber:    Hospital Account Financial Class: Medicare    November 27, 2017 [id number] - Inpatient Moderate Complexity

## 2022-05-23 NOTE — PLAN OF CARE
CARDIOVASCULAR - ADULT    • Maintains optimal cardiac output and hemodynamic stability    IV LASIX CHANGED TO PO.   Progressing        Patient/Family Goals    • Patient/Family Long Term Goal    RETURN TO HOME Progressing    • Patient/Family Short Term Goal Posterior Auricular Interpolation Flap Text: A decision was made to reconstruct the defect utilizing an interpolation axial flap and a staged reconstruction.  A telfa template was made of the defect.  This telfa template was then used to outline the posterior auricular interpolation flap.  The donor area for the pedicle flap was then injected with anesthesia.  The flap was excised through the skin and subcutaneous tissue down to the layer of the underlying musculature.  The pedicle flap was carefully excised within this deep plane to maintain its blood supply.  The edges of the donor site were undermined.   The donor site was closed in a primary fashion.  The pedicle was then rotated into position and sutured.  Once the tube was sutured into place, adequate blood supply was confirmed with blanching and refill.  The pedicle was then wrapped with xeroform gauze and dressed appropriately with a telfa and gauze bandage to ensure continued blood supply and protect the attached pedicle.

## (undated) NOTE — LETTER
March 21, 2018    Jennifer Chavez  [address]    Dear Amna Click: It was a pleasure speaking with you over the phone recently.  To follow up, I wanted to send you some contact information to utilize when you have a question

## (undated) NOTE — IP AVS SNAPSHOT
Patient Demographics     Address  [address] [place] [address] Phone  [phone] (Home) *Preferred*      Emergency Contact(s)     Name Relation Home Work tSephan Daughter   [phone]      Allergies as of 11/27/2017  Review Meclizine HCl 12.5 MG Tabs  Commonly known as:  ANTIVERT      TAKE 1 TABLET(12.5 MG) BY MOUTH THREE TIMES DAILY AS NEEDED   Nestor Goodpasture, MD         Metoprolol Succinate ER 50 MG Tb24  Commonly known as:   Toprol XL  Start taking on:  11/28/2017  Next dose [id number] hydrALAzine HCl (APRESOLINE) tab 25 mg 11/27/17 0511 Given      [id number] hydrALAzine HCl (APRESOLINE) tab 25 mg 11/27/17 1303 Given      [id number] ipratropium-albuterol (DUONEB) nebulizer solution 3 mL 11/26/17 1835 Given      [id number] ipratro Calculated Osmolality 278 275 - 295 mOsm/kg — [facility]   GFR, Non-African American 53 >=60 L [facility]   GFR, African-American >60 >=60 — [facility]   Comment:           Chronic Kidney Disease: GFR <60 ml/min/1.73 m2  Kidney failure: GFR <15 ml/mi who presented to the emergency department with shortness of breath. Upon arrival, his pulse ox was 87% on room air, improved with nasal cannula oxygen. B-natriuretic peptide 350. Troponin, lactic acid, and procalcitonin were negative.   Chest x-ray showe VITAL SIGNS:  Temperature 98.1, pulse 57, respiratory rate 19, blood pressure 124/55, pulse ox 97% on room air. HEENT:  Atraumatic. Oropharynx clear. Moist mucous membranes. Normal hard and soft palate. NECK:  Trachea midline. Full range of motion. 1. IP consult to Pulmonology Once [[id number]] ordered by Anna Ordoñez MD at 17 [address] [facility],[place] [facility]    Report of Consultation    Anneliese Booker Patient Status:  Inpatient    10/11/1928 MRN [id number]   Location [facility] Heparin Sodium (Porcine) 5000 UNIT/ML injection 5,000 Units 5,000 Units Subcutaneous 2 times per day   acetaminophen (TYLENOL) tab 650 mg 650 mg Oral Q6H PRN   ondansetron HCl (ZOFRAN) injection 4 mg 4 mg Intravenous Q6H PRN   docusate sodium (COLACE) cap Constitutional: no acute distress  HEENT: PERRL  Cardio: RRR, S1 S2  Respiratory: Faint basilar crackles  GI: abdomen soft, non tender  Extremities: no clubbing, cyanosis, edema  Neurologic: no gross motor deficits  Skin: warm, dry    Results:   Laboratory 1.  Acute hypoxemic respiratory failure  2. Diffuse groundglass pulmonary opacities  3. Mediastinal/hilar lymphadenopathy  4. Paraseptal emphysema  5.   Right lung nodule    Plan   -Patient presents with significant dyspnea with exertion over the course There is mild tortuosity of the thoracic aorta with peripheral atherosclerotic vascular calcification. The pulmonary vascularity is mildly, diffusely congested. MEDIAST/MAHOGANY: No visible mass or adenopathy by radiographic technique.  LUNGS/PLEURA: Right paul Acute diastolic CHF (congestive heart failure) (HCC)[CK. 2]      ASSESSMENT   Pt seen daily. Min a for bed mobility and transfer. Pt amb 50 ft with RW and min a. Pt educated on deep breathing,relaxation and energy conservation technique. balance activity perf Stoop/Curb Assistance: Not tested[CK. 2]       Additional information:     THERAPEUTIC EXERCISES  Lower Extremity AP,QS,GS,abd/add     Position   supine[CK.1]     Patient End of Session: In bed;Call light within reach;RN aware of session/findings; All patien current functional deficits include bed mobility, transfers, and gait training, which are below the patient's pre-admission status. Patient received supine in bed, agreeable to physical therapy.  COLEEN Davison approved participation in physical therapy and gomez • BPH (benign prostatic hyperplasia)    • CHF (congestive heart failure) (HCC)    • GERD (gastroesophageal reflux disease)    • Unspecified essential hypertension        Past Surgical History  Past Surgical History:  No date: HIP REPLACEMENT SURGERY  No da -   Moving from lying on back to sitting on the side of the bed?: A Little   How much help from another person does the patient currently need. ..   -   Moving to and from a bed to a chair (including a wheelchair)?: A Little   -   Need to walk in hospital r PM  Version 1 of 1    Author:  Vee Austin OT Service:  Rehab Author Type:  Occupational Therapist    Filed:  11/24/2017  3:32 PM Date of Service:  11/24/2017  3:25 PM Status:  Signed    :  Vee Austin OT (Occupational Therapist)       Julianna Marie to be alone, as he does fall frequently. Patient's most limiting factors include impaired balance, activity tolerance, weakness, and SOB.        DISCHARGE RECOMMENDATIONS[AO.1]  OT Discharge Recommendations: Cont skilled therapy in a supervised setting[AO.2 Current Vision: no visual deficits    RANGE OF MOTION   Upper extremity ROM is within functional limits     STRENGTH ASSESSMENT  Upper extremity strength is within functional limits     COORDINATION  Gross Motor: WFL   Fine Motor: WFL     ACTIVITIES OF MALATHI Goals  on:   Frequency: 3x a week    [facility]/R  [address] [place] [address]  #[id number][AO.1]         Attribution Barbosa    AO. 1 Annette Phan OT on 2017  3:25 PM  AO. 2 Annette Phan OT on 2017  3:26 P

## (undated) NOTE — MR AVS SNAPSHOT
[facility] SPECIALTY [facility] [place] [id number] [address] [id number] [phone]               Thank you for choosing us for your health care visit with Otilia Wilhelm MD.  We are glad to serve you and happy to provide you with this summary of y information, go to [email or web address] and click on the Sign Up Now link in the [employer] box. Enter your [employer] Activation Code exactly as it appears below along with your Zip Code and Date of Birth to complete the sign-up process.  If you do You don’t need to join a gym. Home exercises work great.  Put more priority on exercise in your life                    Visit [facility] online at  [email or web address]